# Patient Record
Sex: MALE | Race: WHITE | NOT HISPANIC OR LATINO | Employment: FULL TIME | URBAN - METROPOLITAN AREA
[De-identification: names, ages, dates, MRNs, and addresses within clinical notes are randomized per-mention and may not be internally consistent; named-entity substitution may affect disease eponyms.]

---

## 2017-04-29 ENCOUNTER — OFFICE VISIT (OUTPATIENT)
Dept: URGENT CARE | Facility: CLINIC | Age: 42
End: 2017-04-29
Payer: COMMERCIAL

## 2017-04-29 VITALS
WEIGHT: 171 LBS | TEMPERATURE: 98.4 F | OXYGEN SATURATION: 99 % | HEIGHT: 70 IN | HEART RATE: 62 BPM | BODY MASS INDEX: 24.48 KG/M2 | DIASTOLIC BLOOD PRESSURE: 82 MMHG | SYSTOLIC BLOOD PRESSURE: 102 MMHG

## 2017-04-29 DIAGNOSIS — J03.90 TONSILLITIS: ICD-10-CM

## 2017-04-29 LAB
INT CON NEG: NEGATIVE
INT CON POS: POSITIVE
S PYO AG THROAT QL: NEGATIVE

## 2017-04-29 PROCEDURE — 99214 OFFICE O/P EST MOD 30 MIN: CPT | Performed by: PHYSICIAN ASSISTANT

## 2017-04-29 PROCEDURE — 87880 STREP A ASSAY W/OPTIC: CPT | Performed by: PHYSICIAN ASSISTANT

## 2017-04-29 RX ORDER — AZITHROMYCIN 250 MG/1
TABLET, FILM COATED ORAL
Qty: 6 TAB | Refills: 1 | Status: SHIPPED | OUTPATIENT
Start: 2017-04-29 | End: 2017-12-27

## 2017-04-29 ASSESSMENT — ENCOUNTER SYMPTOMS
EYES NEGATIVE: 1
TROUBLE SWALLOWING: 1
SWOLLEN GLANDS: 1
SORE THROAT: 1
CONSTITUTIONAL NEGATIVE: 1
COUGH: 0
RESPIRATORY NEGATIVE: 1

## 2017-04-29 NOTE — PROGRESS NOTES
"Subjective:      Ben Lackey is a 42 y.o. male who presents with Pharyngitis            Pharyngitis   This is a new problem. The current episode started in the past 7 days. The problem has been unchanged. Neither side of throat is experiencing more pain than the other. There has been no fever. The pain is moderate. Associated symptoms include swollen glands and trouble swallowing. Pertinent negatives include no congestion or coughing. He has had no exposure to strep or mono. He has tried nothing for the symptoms. The treatment provided no relief.       Review of Systems   Constitutional: Negative.    HENT: Positive for sore throat and trouble swallowing. Negative for congestion.    Eyes: Negative.    Respiratory: Negative.  Negative for cough.    Skin: Negative.           Objective:     /82 mmHg  Pulse 62  Temp(Src) 36.9 °C (98.4 °F)  Ht 1.778 m (5' 10\")  Wt 77.565 kg (171 lb)  BMI 24.54 kg/m2  SpO2 99%     Physical Exam   Constitutional: He is oriented to person, place, and time. He appears well-developed and well-nourished. No distress.   HENT:   Head: Normocephalic and atraumatic.   .tons  +phar./tons redn       Eyes: EOM are normal. Pupils are equal, round, and reactive to light.   Neck: Normal range of motion. Neck supple.   Cardiovascular: Normal rate.    Pulmonary/Chest: Effort normal. No respiratory distress.   Lymphadenopathy:     He has cervical adenopathy.   Neurological: He is alert and oriented to person, place, and time.   Skin: Skin is warm and dry.   Psychiatric: He has a normal mood and affect. His behavior is normal. Judgment and thought content normal.   Nursing note and vitals reviewed.    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     No current facility-administered medications on file prior to visit.     Gargles, Cepacol lozenges, Aleve/Advil as needed for throat pain  Family History   Problem Relation Age of Onset   • Cancer Maternal Grandmother      colon   • Heart Attack Paternal Grandfather    • Hypertension Mother      Amoxicillin and Pcn         rst ng     Assessment/Plan:     ·  tonsillitis      · Zpak; otc prn

## 2017-04-29 NOTE — MR AVS SNAPSHOT
"        Ben Lackey   2017 12:00 PM   Office Visit   MRN: [id number]    Department:  [facility]   Dept Phone:  [phone]    Description:  Male : 1975   Provider:  Kendell Elena PA-C           Reason for Visit     Pharyngitis X Monday, worse last night, Hard to swallow, post nasal drip       Allergies as of 2017     Allergen Noted Reactions    Amoxicillin 10/30/2014   Hives    Pcn [Penicillins] 10/30/2014   Hives      You were diagnosed with     Tonsillitis   [[id number]]         Vital Signs     Blood Pressure Pulse Temperature Height Weight Body Mass Index    102/82 mmHg 62 36.9 °C (98.4 °F) 1.778 m (5' 10\") 77.565 kg (171 lb) 24.54 kg/m2    Oxygen Saturation Smoking Status                99% Former Smoker          Basic Information     Date Of Birth Sex Race Ethnicity Preferred Language    1975 Male White Non- English      Problem List              ICD-10-CM Priority Class Noted - Resolved    Insomnia G47.00   2016 - Present    Eosinophilic colitis K52.82   2016 - Present      Health Maintenance        Date Due Completion Dates    IMM DTaP/Tdap/Td Vaccine (1 - Tdap) 2/15/1996 ---    COLONOSCOPY 2020            Current Immunizations     No immunizations on file.      Below and/or attached are the medications your provider expects you to take. Review all of your home medications and newly ordered medications with your provider and/or pharmacist. Follow medication instructions as directed by your provider and/or pharmacist. Please keep your medication list with you and share with your provider. Update the information when medications are discontinued, doses are changed, or new medications (including over-the-counter products) are added; and carry medication information at all times in the event of emergency situations     Allergies:  AMOXICILLIN - Hives     PCN - Hives               Medications  Valid as of: 2017 - 12:46 PM    Generic Name " Brand Name Tablet Size Instructions for use    Azithromycin (Tab) ZITHROMAX 250 MG z-josé luis; U.D.        .                 Medicines prescribed today were sent to:     [id number] - [place], [place] - [address]    [address] [place] [address]    Phone: [phone] Fax: [phone]    Open 24 Hours?: No      Medication refill instructions:       If your prescription bottle indicates you have medication refills left, it is not necessary to call your provider’s office. Please contact your pharmacy and they will refill your medication.    If your prescription bottle indicates you do not have any refills left, you may request refills at any time through one of the following ways: The online [employer] system (except Urgent Care), by calling your provider’s office, or by asking your pharmacy to contact your provider’s office with a refill request. Medication refills are processed only during regular business hours and may not be available until the next business day. Your provider may request additional information or to have a follow-up visit with you prior to refilling your medication.   *Please Note: Medication refills are assigned a new Rx number when refilled electronically. Your pharmacy may indicate that no refills were authorized even though a new prescription for the same medication is available at the pharmacy. Please request the medicine by name with the pharmacy before contacting your provider for a refill.           [employer] Access Code: Activation code not generated  Current [employer] Status: Active

## 2017-10-06 ENCOUNTER — TELEPHONE (OUTPATIENT)
Dept: MEDICAL GROUP | Facility: MEDICAL CENTER | Age: 42
End: 2017-10-06

## 2017-10-06 NOTE — TELEPHONE ENCOUNTER
Future Appointments       Provider Department Center    10/13/2017 1:20 PM Bairon Calderon M.D. [facility] [address]        ESTABLISHED PATIENT PRE-VISIT PLANNING     Note: Patient will not be contacted if there is no indication to call.     1.  Reviewed note from last office visit with PCP and/or other med group provider: Yes    2.  If any orders were placed at last visit, do we have Results/Consult Notes?        •  Labs - Labs were not ordered at last office visit.       •  Imaging - Imaging was not ordered at last office visit.       •  Referrals - No referrals were ordered at last office visit.    3.  Immunizations were updated in [employer] using [employer]?: Yes       •  [employer] Recommendations: FLU    4.  Patient is due for the following Health Maintenance Topics:   Health Maintenance Due   Topic Date Due   • IMM INFLUENZA (1) 09/01/2017           5.  Patient was not informed to arrive 15 min prior to their scheduled appointment and bring in their medication bottles.

## 2017-12-27 ENCOUNTER — OFFICE VISIT (OUTPATIENT)
Dept: MEDICAL GROUP | Facility: MEDICAL CENTER | Age: 42
End: 2017-12-27
Payer: COMMERCIAL

## 2017-12-27 VITALS
HEART RATE: 94 BPM | RESPIRATION RATE: 16 BRPM | DIASTOLIC BLOOD PRESSURE: 60 MMHG | SYSTOLIC BLOOD PRESSURE: 116 MMHG | WEIGHT: 165 LBS | HEIGHT: 70 IN | OXYGEN SATURATION: 98 % | BODY MASS INDEX: 23.62 KG/M2 | TEMPERATURE: 99.5 F

## 2017-12-27 DIAGNOSIS — Z13.220 SCREENING, LIPID: ICD-10-CM

## 2017-12-27 DIAGNOSIS — M54.6 CHRONIC LEFT-SIDED THORACIC BACK PAIN: ICD-10-CM

## 2017-12-27 DIAGNOSIS — G89.29 CHRONIC LEFT-SIDED THORACIC BACK PAIN: ICD-10-CM

## 2017-12-27 DIAGNOSIS — F51.01 PRIMARY INSOMNIA: ICD-10-CM

## 2017-12-27 DIAGNOSIS — K21.9 GASTROESOPHAGEAL REFLUX DISEASE WITHOUT ESOPHAGITIS: ICD-10-CM

## 2017-12-27 PROBLEM — F41.9 ANXIETY: Status: ACTIVE | Noted: 2017-12-27

## 2017-12-27 PROBLEM — F41.9 ANXIETY: Status: RESOLVED | Noted: 2017-12-27 | Resolved: 2017-12-27

## 2017-12-27 PROCEDURE — 99214 OFFICE O/P EST MOD 30 MIN: CPT | Performed by: INTERNAL MEDICINE

## 2017-12-27 RX ORDER — OMEPRAZOLE 20 MG/1
20 CAPSULE, DELAYED RELEASE ORAL DAILY
COMMUNITY
End: 2021-10-27

## 2017-12-27 RX ORDER — CYCLOBENZAPRINE HCL 10 MG
10 TABLET ORAL 3 TIMES DAILY PRN
Qty: 30 TAB | Refills: 0 | Status: SHIPPED | OUTPATIENT
Start: 2017-12-27 | End: 2018-08-31

## 2017-12-27 ASSESSMENT — PATIENT HEALTH QUESTIONNAIRE - PHQ9: CLINICAL INTERPRETATION OF PHQ2 SCORE: 0

## 2017-12-27 NOTE — PROGRESS NOTES
"CC: Multiple issues    HPI:   Ben presents today with the following.    1. Gastroesophageal reflux disease without esophagitis  Presents complaining intermittent reflux but 2 days ago reported some difficulty swallowing with food going slowly through his lower chest. No true complete sticking is never had something like this before. Started taking acid pills and symptoms have improved. Denies any blood in stool or black stools.    2. Primary insomnia  Increased stress at work reporting difficulty falling asleep and staying asleep. No true depression or anxiety symptoms.    3. Chronic left-sided thoracic back pain  Having left-sided thoracic back pain no radiation to his legs or arms. Pain is 4-10 intensity somewhat positionally related. Denies any chest pain or shortness of breath.     4. 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     • cyclobenzaprine (FLEXERIL) 10 MG Tab Take 1 Tab by mouth 3 times a day as needed. 30 Tab 0     No current facility-administered medications for this visit.          Allergies as of 12/27/2017 - Reviewed 12/27/2017   Allergen Reaction Noted   • Amoxicillin Hives 10/30/2014   • Pcn [penicillins] Hives 10/30/2014        ROS: As per HPI.    /60   Pulse 94   Temp 37.5 °C (99.5 °F)   Resp 16   Ht 1.778 m (5' 10\")   Wt 74.8 kg (165 lb)   SpO2 98%   BMI 23.68 kg/m²     Physical Exam:  Gen:         Alert and oriented, No apparent distress.  Neck:        No Lymphadenopathy or Bruits.  Lungs:     Clear to auscultation bilaterally  CV:          Regular rate and rhythm. No murmurs, rubs or gallops.               Ext:          No clubbing, cyanosis, edema.      Assessment " and Plan.   42 y.o. male with the following issues.    1. Gastroesophageal reflux disease without esophagitis  Continue PPI referral to gastroenterology  - REFERRAL TO GASTROENTEROLOGY    2. Primary insomnia  Trial of muscle relaxants below we will see if also helps with sleep if not suggested Benadryl over-the-counter.    3. Chronic left-sided thoracic back pain  Recommendations for physical therapy like to hold off muscle relaxant and not improving he'll contact the office.  - cyclobenzaprine (FLEXERIL) 10 MG Tab; Take 1 Tab by mouth 3 times a day as needed.  Dispense: 30 Tab; Refill: 0    4. Screening, lipid    - COMP METABOLIC PANEL; Future  - LIPID PROFILE; Future

## 2018-01-09 ENCOUNTER — HOSPITAL ENCOUNTER (OUTPATIENT)
Dept: LAB | Facility: MEDICAL CENTER | Age: 43
End: 2018-01-09
Attending: INTERNAL MEDICINE
Payer: COMMERCIAL

## 2018-01-09 DIAGNOSIS — Z13.220 SCREENING, LIPID: ICD-10-CM

## 2018-01-09 PROCEDURE — 80061 LIPID PANEL: CPT

## 2018-01-09 PROCEDURE — 36415 COLL VENOUS BLD VENIPUNCTURE: CPT

## 2018-01-09 PROCEDURE — 80053 COMPREHEN METABOLIC PANEL: CPT

## 2018-01-10 LAB
ALBUMIN SERPL BCP-MCNC: 4.5 G/DL (ref 3.2–4.9)
ALBUMIN/GLOB SERPL: 2.3 G/DL
ALP SERPL-CCNC: 39 U/L (ref 30–99)
ALT SERPL-CCNC: 20 U/L (ref 2–50)
ANION GAP SERPL CALC-SCNC: 6 MMOL/L (ref 0–11.9)
AST SERPL-CCNC: 22 U/L (ref 12–45)
BILIRUB SERPL-MCNC: 0.4 MG/DL (ref 0.1–1.5)
BUN SERPL-MCNC: 10 MG/DL (ref 8–22)
CALCIUM SERPL-MCNC: 9.6 MG/DL (ref 8.5–10.5)
CHLORIDE SERPL-SCNC: 103 MMOL/L (ref 96–112)
CHOLEST SERPL-MCNC: 141 MG/DL (ref 100–199)
CO2 SERPL-SCNC: 27 MMOL/L (ref 20–33)
CREAT SERPL-MCNC: 0.94 MG/DL (ref 0.5–1.4)
GLOBULIN SER CALC-MCNC: 2 G/DL (ref 1.9–3.5)
GLUCOSE SERPL-MCNC: 91 MG/DL (ref 65–99)
HDLC SERPL-MCNC: 62 MG/DL
LDLC SERPL CALC-MCNC: 68 MG/DL
POTASSIUM SERPL-SCNC: 4.6 MMOL/L (ref 3.6–5.5)
PROT SERPL-MCNC: 6.5 G/DL (ref 6–8.2)
SODIUM SERPL-SCNC: 136 MMOL/L (ref 135–145)
TRIGL SERPL-MCNC: 54 MG/DL (ref 0–149)

## 2018-04-01 ENCOUNTER — OFFICE VISIT (OUTPATIENT)
Dept: URGENT CARE | Facility: CLINIC | Age: 43
End: 2018-04-01
Payer: COMMERCIAL

## 2018-04-01 VITALS
WEIGHT: 167.55 LBS | DIASTOLIC BLOOD PRESSURE: 74 MMHG | SYSTOLIC BLOOD PRESSURE: 120 MMHG | OXYGEN SATURATION: 98 % | HEIGHT: 70 IN | BODY MASS INDEX: 23.99 KG/M2 | HEART RATE: 78 BPM | TEMPERATURE: 98 F

## 2018-04-01 DIAGNOSIS — R68.89 FLU-LIKE SYMPTOMS: ICD-10-CM

## 2018-04-01 DIAGNOSIS — J02.9 ACUTE PHARYNGITIS, UNSPECIFIED ETIOLOGY: ICD-10-CM

## 2018-04-01 PROCEDURE — 99214 OFFICE O/P EST MOD 30 MIN: CPT | Performed by: PHYSICIAN ASSISTANT

## 2018-04-01 RX ORDER — OSELTAMIVIR PHOSPHATE 75 MG/1
75 CAPSULE ORAL 2 TIMES DAILY
Qty: 10 CAP | Refills: 0 | Status: SHIPPED | OUTPATIENT
Start: 2018-04-01 | End: 2018-04-06

## 2018-04-01 RX ORDER — AZITHROMYCIN 250 MG/1
TABLET, FILM COATED ORAL
Qty: 6 TAB | Refills: 1 | Status: SHIPPED | OUTPATIENT
Start: 2018-04-01 | End: 2018-08-31

## 2018-04-01 ASSESSMENT — ENCOUNTER SYMPTOMS
CARDIOVASCULAR NEGATIVE: 1
COUGH: 0
SWOLLEN GLANDS: 1
STRIDOR: 0
RESPIRATORY NEGATIVE: 1
EYES NEGATIVE: 1
SORE THROAT: 1
MYALGIAS: 1
TROUBLE SWALLOWING: 1
CONSTITUTIONAL NEGATIVE: 1

## 2018-04-01 NOTE — PROGRESS NOTES
"Subjective:      Ben Lackey is a 43 y.o. male who presents with Pharyngitis (x3 days)            Pharyngitis    This is a new (acute st, aches) problem. The current episode started in the past 7 days. The problem has been unchanged. Neither side of throat is experiencing more pain than the other. There has been no fever. The pain is severe. Associated symptoms include swollen glands and trouble swallowing. Pertinent negatives include no coughing or stridor. He has had no exposure to strep. He has tried nothing for the symptoms. The treatment provided no relief.       Review of Systems   Constitutional: Negative.    HENT: Positive for sore throat and trouble swallowing.    Eyes: Negative.    Respiratory: Negative.  Negative for cough and stridor.    Cardiovascular: Negative.    Musculoskeletal: Positive for myalgias.   Skin: Negative.           Objective:     /74   Pulse 78   Temp 36.7 °C (98 °F)   Ht 1.778 m (5' 10\")   Wt 76 kg (167 lb 8.8 oz)   SpO2 98%   BMI 24.04 kg/m²      Physical Exam   Constitutional: He is oriented to person, place, and time. He appears well-developed and well-nourished. No distress.   HENT:   Head: Normocephalic and atraumatic.   +phar./tons redn       Eyes: EOM are normal. Pupils are equal, round, and reactive to light.   Neck: Normal range of motion. Neck supple.   Cardiovascular: Normal rate.    Pulmonary/Chest: Effort normal and breath sounds normal. No respiratory distress.   Lymphadenopathy:     He has cervical adenopathy.   Neurological: He is alert and oriented to person, place, and time.   Skin: Skin is warm and dry.   Psychiatric: He has a normal mood and affect. His behavior is normal.   Nursing note and vitals reviewed.       rst ng       Assessment/Plan:     1. Flu-like symptoms    - azithromycin (ZITHROMAX) 250 MG Tab; z-jos éluis; U.D.  Dispense: 6 Tab; Refill: 1  - oseltamivir (TAMIFLU) 75 MG Cap; Take 1 Cap by mouth 2 times a day for 5 days.  Dispense: 10 Cap; " Refill: 0  - Alum & Mag Hydroxide-Simeth (MBX) Suspension; Take 15 mL by mouth 4 times a day as needed (to gargle and swallow or spit for sore throat). (Pharm, mix: Mylanta / visc lidoc. / benadryl in 50-50-50ml mix.  rw)  Dispense: 150 mL; Refill: 0    2. Acute pharyngitis, unspecified etiology

## 2018-04-23 ENCOUNTER — OFFICE VISIT (OUTPATIENT)
Dept: URGENT CARE | Facility: CLINIC | Age: 43
End: 2018-04-23
Payer: COMMERCIAL

## 2018-04-23 VITALS
WEIGHT: 167.99 LBS | TEMPERATURE: 98.6 F | HEIGHT: 70 IN | SYSTOLIC BLOOD PRESSURE: 110 MMHG | HEART RATE: 93 BPM | OXYGEN SATURATION: 96 % | DIASTOLIC BLOOD PRESSURE: 70 MMHG | BODY MASS INDEX: 24.05 KG/M2

## 2018-04-23 DIAGNOSIS — M94.0 ACUTE COSTOCHONDRITIS: ICD-10-CM

## 2018-04-23 DIAGNOSIS — J22 ACUTE LOWER RESPIRATORY INFECTION: Primary | ICD-10-CM

## 2018-04-23 PROCEDURE — 99214 OFFICE O/P EST MOD 30 MIN: CPT | Performed by: PHYSICIAN ASSISTANT

## 2018-04-23 RX ORDER — CODEINE PHOSPHATE/GUAIFENESIN 10-100MG/5
10 LIQUID (ML) ORAL 4 TIMES DAILY PRN
Qty: 200 ML | Refills: 0 | Status: SHIPPED | OUTPATIENT
Start: 2018-04-23 | End: 2018-04-28

## 2018-04-23 RX ORDER — DOXYCYCLINE HYCLATE 100 MG
100 TABLET ORAL 2 TIMES DAILY
Qty: 20 TAB | Refills: 0 | Status: SHIPPED | OUTPATIENT
Start: 2018-04-23 | End: 2018-05-03

## 2018-04-23 ASSESSMENT — ENCOUNTER SYMPTOMS
WHEEZING: 0
SORE THROAT: 0
MYALGIAS: 1
PALPITATIONS: 0
SPUTUM PRODUCTION: 1
COUGH: 1
FEVER: 0
SHORTNESS OF BREATH: 0

## 2018-04-23 NOTE — PROGRESS NOTES
"Subjective:      Ben Lackey is a 43 y.o. male who presents with Cough (\"not sleeping,chest congestion,painful cough,fatigue\")    PMH: Reviewed with patient/family member/EPIC.   MEDS:   Current Outpatient Prescriptions:   •  omeprazole (PRILOSEC) 20 MG delayed-release capsule, Take 20 mg by mouth every day., Disp: , Rfl:   •  azithromycin (ZITHROMAX) 250 MG Tab, z-josé luis; U.D., Disp: 6 Tab, Rfl: 1  •  Alum & Mag Hydroxide-Simeth (MBX) Suspension, Take 15 mL by mouth 4 times a day as needed (to gargle and swallow or spit for sore throat). (Pharm, mix: Mylanta / visc lidoc. / benadryl in 50-50-50ml mix.  rw), Disp: 150 mL, Rfl: 0  •  cyclobenzaprine (FLEXERIL) 10 MG Tab, Take 1 Tab by mouth 3 times a day as needed., Disp: 30 Tab, Rfl: 0  ALLERGIES:   Allergies   Allergen Reactions   • Amoxicillin Hives   • Pcn [Penicillins] Hives     SURGHX: No past surgical history on file.  SOCHX:  reports that he quit smoking about 23 years ago. He has never used smokeless tobacco. He reports that he drinks about 7.0 oz of alcohol per week . He reports that he does not use drugs.  FH: family history includes Cancer in his maternal grandmother; Heart Attack in his paternal grandfather; Hypertension in his mother. Reviewed with patient/family. Not pertinent to this complaint.            Cough   This is a new problem. The current episode started 1 to 4 weeks ago. The problem has been gradually worsening. The problem occurs every few minutes. The cough is productive of sputum. Associated symptoms include myalgias, nasal congestion and postnasal drip. Pertinent negatives include no chest pain, fever, sore throat, shortness of breath or wheezing. The symptoms are aggravated by lying down. He has tried body position changes, OTC cough suppressant and rest for the symptoms. The treatment provided no relief. His past medical history is significant for pneumonia (twice).       Review of Systems   Constitutional: Positive for " "malaise/fatigue. Negative for fever.   HENT: Positive for postnasal drip. Negative for sore throat.    Respiratory: Positive for cough and sputum production. Negative for shortness of breath and wheezing.    Cardiovascular: Negative for chest pain and palpitations.   Musculoskeletal: Positive for myalgias.   All other systems reviewed and are negative.         Objective:     /70   Pulse 93   Temp 37 °C (98.6 °F)   Ht 1.778 m (5' 10\")   Wt 76.2 kg (167 lb 15.9 oz)   SpO2 96%   BMI 24.10 kg/m²      Physical Exam   Constitutional: He is oriented to person, place, and time. He appears well-developed and well-nourished. No distress.   HENT:   Head: Normocephalic and atraumatic.   Right Ear: Tympanic membrane normal.   Left Ear: Tympanic membrane normal.   Nose: Mucosal edema and rhinorrhea present.   Mouth/Throat: Uvula is midline. Posterior oropharyngeal erythema present. No posterior oropharyngeal edema.   Eyes: Conjunctivae and EOM are normal. Pupils are equal, round, and reactive to light.   Neck: Normal range of motion. Neck supple.   Cardiovascular: Normal rate, regular rhythm and normal heart sounds.    Pulmonary/Chest: Effort normal. No respiratory distress. He has no decreased breath sounds. He has no wheezes. He has rhonchi. He has no rales.   Productive coarse cough   Abdominal: Soft.   Musculoskeletal: Normal range of motion.   Lymphadenopathy:     He has no cervical adenopathy.   Neurological: He is alert and oriented to person, place, and time. Gait normal.   Skin: Skin is warm and dry. Capillary refill takes less than 2 seconds.   Psychiatric: He has a normal mood and affect.   Nursing note and vitals reviewed.              Assessment/Plan:     1. Acute lower respiratory infection  doxycycline (VIBRAMYCIN) 100 MG Tab    guaifenesin-codeine (TUSSI-ORGANIDIN NR) 100-10 MG/5ML syrup   2. Acute costochondritis  doxycycline (VIBRAMYCIN) 100 MG Tab    guaifenesin-codeine (TUSSI-ORGANIDIN NR) 100-10 " MG/5ML syrup       PT can continue OTC medications, increase fluids and rest until symptoms improve.     PT instructed not to drive or operate heavy machinery or drink alcohol while taking this medication because it contains either narcotic/benzodiazepines and causes drowsiness. PT verbalized understanding of these instructions.     [facility] Aware web site evaluation: I have obtained and reviewed patient utilization report from [facility] pharmacy database prior to writing prescription for controlled substance.  No history of abuse.      PT should follow up with PCP in 1-2 days for re-evaluation if symptoms have not improved.  Discussed red flags and reasons to return to UC or ED.  Pt and/or family verbalized understanding of diagnosis and follow up instructions and was offered informational handout on diagnosis.  PT discharged.

## 2018-08-25 ENCOUNTER — OFFICE VISIT (OUTPATIENT)
Dept: URGENT CARE | Facility: CLINIC | Age: 43
End: 2018-08-25
Payer: COMMERCIAL

## 2018-08-25 VITALS
TEMPERATURE: 98 F | DIASTOLIC BLOOD PRESSURE: 70 MMHG | BODY MASS INDEX: 24.62 KG/M2 | SYSTOLIC BLOOD PRESSURE: 124 MMHG | RESPIRATION RATE: 16 BRPM | HEART RATE: 68 BPM | WEIGHT: 172 LBS | OXYGEN SATURATION: 98 % | HEIGHT: 70 IN

## 2018-08-25 DIAGNOSIS — L03.011 PARONYCHIA OF FINGER OF RIGHT HAND: ICD-10-CM
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
## 2018-08-25 DIAGNOSIS — S61.259A DOG BITE OF FINGER, INITIAL ENCOUNTER: ICD-10-CM

## 2018-08-25 PROCEDURE — 99214 OFFICE O/P EST MOD 30 MIN: CPT | Performed by: PHYSICIAN ASSISTANT

## 2018-08-25 RX ORDER — AZITHROMYCIN 500 MG/1
500 TABLET, FILM COATED ORAL DAILY
Qty: 3 TAB | Refills: 1 | Status: SHIPPED | OUTPATIENT
Start: 2018-08-25 | End: 2018-08-28

## 2018-08-25 RX ORDER — CLINDAMYCIN HYDROCHLORIDE 300 MG/1
300 CAPSULE ORAL 3 TIMES DAILY
Qty: 21 CAP | Refills: 0 | Status: SHIPPED | OUTPATIENT
Start: 2018-08-25 | End: 2018-09-01

## 2018-08-25 ASSESSMENT — ENCOUNTER SYMPTOMS
SWOLLEN GLANDS: 0
CONSTITUTIONAL NEGATIVE: 1
NEUROLOGICAL NEGATIVE: 1
FEVER: 0
MUSCULOSKELETAL NEGATIVE: 1

## 2018-08-25 NOTE — PROGRESS NOTES
"Subjective:      Ben Lackey is a 43 y.o. male who presents with Dog Bite (dog bite on middle finger, yellow puss coming out of nail area, numb feeling )            Other   This is a new problem. The current episode started yesterday (dogbite 3rd finger; infected). The problem occurs constantly. The problem has been unchanged. Pertinent negatives include no fever or swollen glands. The symptoms are aggravated by bending. He has tried rest for the symptoms. The treatment provided no relief.       Review of Systems   Constitutional: Negative.  Negative for fever.   Musculoskeletal: Negative.    Skin: Negative.    Neurological: Negative.           Objective:     /70   Pulse 68   Temp 36.7 °C (98 °F)   Resp 16   Ht 1.778 m (5' 10\")   Wt 78 kg (172 lb)   SpO2 98%   BMI 24.68 kg/m²      Physical Exam   Constitutional: He is oriented to person, place, and time. He appears well-developed and well-nourished. No distress.   Musculoskeletal: Normal range of motion. He exhibits edema and tenderness (4th finger dogbite area redn/sw/tend; paronychia inf 3rd finger).   Neurological: He is alert and oriented to person, place, and time. No sensory deficit. He exhibits abnormal muscle tone. Coordination normal.   Skin: Skin is warm and dry. Capillary refill takes less than 2 seconds.   Psychiatric: He has a normal mood and affect. His behavior is normal.   Nursing note and vitals reviewed.    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 6 Tab 1   • Alum & Mag Hydroxide-Simeth (MBX) Suspension Take 15 mL by mouth 4 times a day as " needed (to gargle and swallow or spit for sore throat). (Pharm, mix: Mylanta / visc lidoc. / benadryl in 50-50-50ml mix.  rw) 150 mL 0   • omeprazole (PRILOSEC) 20 MG delayed-release capsule Take 20 mg by mouth every day.     • cyclobenzaprine (FLEXERIL) 10 MG Tab Take 1 Tab by mouth 3 times a day as needed. 30 Tab 0     No current facility-administered medications on file prior to visit.      Social History     Social History   • Marital status:      Spouse name: N/A   • Number of children: N/A   • Years of education: N/A     Occupational History   • Not on file.     Social History Main Topics   • Smoking status: Former Smoker     Quit date: 10/30/1994   • Smokeless tobacco: Never Used   • Alcohol use 7.0 oz/week     14 Cans of beer per week   • Drug use: No   • Sexual activity: Yes     Partners: Female      Comment: wife     Other Topics Concern   • Not on file     Social History Narrative   • No narrative on file     Family History   Problem Relation Age of Onset   • Cancer Maternal Grandmother         colon   • Heart Attack Paternal Grandfather    • Hypertension Mother      Amoxicillin and Pcn [penicillins]              Assessment/Plan:     ·  dogbite finger, infected      · rx abx; local wound care

## 2018-08-31 ENCOUNTER — OFFICE VISIT (OUTPATIENT)
Dept: MEDICAL GROUP | Facility: MEDICAL CENTER | Age: 43
End: 2018-08-31
Payer: COMMERCIAL

## 2018-08-31 VITALS
TEMPERATURE: 98.5 F | BODY MASS INDEX: 24.62 KG/M2 | HEIGHT: 70 IN | DIASTOLIC BLOOD PRESSURE: 70 MMHG | OXYGEN SATURATION: 99 % | SYSTOLIC BLOOD PRESSURE: 110 MMHG | RESPIRATION RATE: 16 BRPM | HEART RATE: 70 BPM | WEIGHT: 172 LBS

## 2018-08-31 DIAGNOSIS — M26.621 ARTHRALGIA OF RIGHT TEMPOROMANDIBULAR JOINT: ICD-10-CM

## 2018-08-31 PROCEDURE — 99213 OFFICE O/P EST LOW 20 MIN: CPT | Performed by: INTERNAL MEDICINE

## 2018-08-31 RX ORDER — METHYLPREDNISOLONE 4 MG/1
TABLET ORAL
Qty: 21 TAB | Refills: 0 | Status: SHIPPED | OUTPATIENT
Start: 2018-08-31 | End: 2021-10-27

## 2018-08-31 NOTE — PROGRESS NOTES
"CC: Jaw pain    HPI:   Ben presents today with the following.    1. Arthralgia of right temporomandibular joint  Presents complaining of right-sided jaw pain.  He reports it hurts with chewing.  There is some slight clicking.  Denies any fevers chills.  He is current with a tetanus shot.  He reports he ate soup yesterday and today it is feeling somewhat better.  Pain is 4 out of 10 intensity worse with chewing.  Ibuprofen is very hard in his stomach and he cannot take.      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 21 Tab 0   • omeprazole (PRILOSEC) 20 MG delayed-release capsule Take 20 mg by mouth every day.     • clindamycin (CLEOCIN) 300 MG Cap Take 1 Cap by mouth 3 times a day for 7 days. (Patient not taking: Reported on 8/31/2018) 21 Cap 0   • Alum & Mag Hydroxide-Simeth (MBX) Suspension Take 15 mL by mouth 4 times a day as needed (to gargle and swallow or spit for sore throat). (Pharm, mix: Mylanta / visc lidoc. / benadryl in 50-50-50ml mix.  rw) (Patient not taking: Reported on 8/31/2018) 150 mL 0     No current facility-administered medications for this visit.          Allergies as of 08/31/2018 - Reviewed 08/31/2018   Allergen Reaction Noted   • Amoxicillin Hives 10/30/2014   • Pcn [penicillins] Hives 10/30/2014        ROS: Denies Chest pain, SOB, LE edema.    /70   Pulse 70   Temp 36.9 °C (98.5 °F)   Resp 16   Ht 1.778 m (5' 10\")   Wt 78 kg (172 lb)   SpO2 99%   BMI 24.68 kg/m²     Physical Exam:  Gen:         Alert and oriented, No apparent distress.  Neck:        No Lymphadenopathy or Bruits.  Lungs:     Clear to auscultation bilaterally  CV:          Regular rate and rhythm. No murmurs, rubs or gallops.   "             Ext:          No clubbing, cyanosis, edema.      Assessment and Plan.   43 y.o. male with the following issues.    1. Arthralgia of right temporomandibular joint  Consistent with TMJ have placed on a steroid given his intolerance to NSAIDs recommended rest if not improving he will follow-up with Dentist.  - MethylPREDNISolone (MEDROL DOSEPAK) 4 MG Tablet Therapy Pack; Per package insert.  Dispense: 21 Tab; Refill: 0

## 2018-10-11 ENCOUNTER — OFFICE VISIT (OUTPATIENT)
Dept: MEDICAL GROUP | Facility: MEDICAL CENTER | Age: 43
End: 2018-10-11
Payer: COMMERCIAL

## 2018-10-11 VITALS
RESPIRATION RATE: 16 BRPM | DIASTOLIC BLOOD PRESSURE: 78 MMHG | WEIGHT: 171 LBS | TEMPERATURE: 98 F | SYSTOLIC BLOOD PRESSURE: 114 MMHG | HEIGHT: 70 IN | BODY MASS INDEX: 24.48 KG/M2 | HEART RATE: 85 BPM | OXYGEN SATURATION: 97 %

## 2018-10-11 DIAGNOSIS — F41.9 ANXIETY: ICD-10-CM

## 2018-10-11 DIAGNOSIS — R07.89 CHEST DISCOMFORT: ICD-10-CM

## 2018-10-11 DIAGNOSIS — I49.1 PAC (PREMATURE ATRIAL CONTRACTION): ICD-10-CM

## 2018-10-11 PROCEDURE — 99214 OFFICE O/P EST MOD 30 MIN: CPT | Performed by: INTERNAL MEDICINE

## 2018-10-11 RX ORDER — ESCITALOPRAM OXALATE 10 MG/1
10 TABLET ORAL DAILY
Qty: 30 TAB | Refills: 6 | Status: SHIPPED | OUTPATIENT
Start: 2018-10-11 | End: 2021-10-27

## 2018-10-11 NOTE — PROGRESS NOTES
CC: Chest pain, PACs, anxiety.    HPI:   Ben presents today with the following.    1. Chest discomfort  Presents complaining of 3 weeks of chest discomfort.  He describes them lasting seconds unrelated to physical exertion.  He does run on the treadmill as well as lift weights and may notice it when he bench presses occasionally.  He cannot fully describe it is a pain and again only lasts for 1 second.  No shortness of breath no numbness or tingling no other associated symptoms.    2. PAC (premature atrial contraction)  He does bring up that he is been worked up for cardiac issues in the past echocardiogram normal was found to have PACs on multiple Holter monitors.  He does not describe these as similar sensations to PACs.    3. Anxiety  He does admit after discussion that he is anxious about his work currently and is dwelling on several things when he does not need to be.  He fully aware that his chest discomfort is likely nothing but he cannot stop thinking about it.      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 30 Tab 6   • omeprazole (PRILOSEC) 20 MG delayed-release capsule Take 20 mg by mouth every day.     • MethylPREDNISolone (MEDROL DOSEPAK) 4 MG Tablet Therapy Pack Per package insert. 21 Tab 0   • Alum & Mag Hydroxide-Simeth (MBX) Suspension Take 15 mL by mouth 4 times a day as needed (to gargle and swallow or spit for sore throat). (Pharm, mix: Mylanta / visc lidoc. / benadryl in 50-50-50ml mix.  rw) (Patient not taking: Reported on 8/31/2018) 150 mL 0     No current facility-administered medications for this visit.          Allergies as of 10/11/2018 - Reviewed  "10/11/2018   Allergen Reaction Noted   • Amoxicillin Hives 10/30/2014   • Pcn [penicillins] Hives 10/30/2014        ROS: Denies changes to bowel or bladder, SOB, LE edema.    /78   Pulse 85   Temp 36.7 °C (98 °F)   Resp 16   Ht 1.77 m (5' 9.69\")   Wt 77.6 kg (171 lb)   SpO2 97%   BMI 24.76 kg/m²     Physical Exam:  Gen:         Alert and oriented, No apparent distress.  Neck:        No Lymphadenopathy or Bruits.  Lungs:     Clear to auscultation bilaterally  CV:          Regular rate and rhythm. No murmurs, rubs or gallops.               Ext:          No clubbing, cyanosis, edema.      Assessment and Plan.   43 y.o. male with the following issues.    1. Chest discomfort  Given the descriptors and his extreme physical activity very unlikely to be heart related.  He is fine not going any further testing he was given ER precautions and what to watch for.    2. PAC (premature atrial contraction)  Thoroughly worked up in the past he does notice them but is not terribly concerned.    3. Anxiety  Longer discussion today about anxiety he does admit to having most problems he is willing to try Lexapro sent to pharmacy today at a very low dose will see back in 4-6 weeks.  Cautioned about side effects      "

## 2018-11-20 ENCOUNTER — OFFICE VISIT (OUTPATIENT)
Dept: URGENT CARE | Facility: CLINIC | Age: 43
End: 2018-11-20
Payer: COMMERCIAL

## 2018-11-20 VITALS
HEART RATE: 82 BPM | RESPIRATION RATE: 14 BRPM | BODY MASS INDEX: 23.99 KG/M2 | WEIGHT: 167.6 LBS | HEIGHT: 70 IN | OXYGEN SATURATION: 96 % | SYSTOLIC BLOOD PRESSURE: 100 MMHG | DIASTOLIC BLOOD PRESSURE: 80 MMHG | TEMPERATURE: 99.5 F

## 2018-11-20 DIAGNOSIS — J06.9 UPPER RESPIRATORY TRACT INFECTION, UNSPECIFIED TYPE: ICD-10-CM

## 2018-11-20 DIAGNOSIS — R09.82 PND (POST-NASAL DRIP): ICD-10-CM

## 2018-11-20 DIAGNOSIS — R05.9 COUGH: ICD-10-CM

## 2018-11-20 DIAGNOSIS — J02.9 PHARYNGITIS, UNSPECIFIED ETIOLOGY: ICD-10-CM

## 2018-11-20 LAB
INT CON NEG: NORMAL
INT CON POS: NORMAL
S PYO AG THROAT QL: NEGATIVE

## 2018-11-20 PROCEDURE — 87880 STREP A ASSAY W/OPTIC: CPT | Performed by: PHYSICIAN ASSISTANT

## 2018-11-20 PROCEDURE — 99214 OFFICE O/P EST MOD 30 MIN: CPT | Performed by: PHYSICIAN ASSISTANT

## 2018-11-20 RX ORDER — PROMETHAZINE HYDROCHLORIDE AND CODEINE PHOSPHATE 6.25; 1 MG/5ML; MG/5ML
5 SYRUP ORAL EVERY 12 HOURS PRN
Qty: 60 ML | Refills: 0 | Status: SHIPPED | OUTPATIENT
Start: 2018-11-20 | End: 2018-11-27

## 2018-11-20 ASSESSMENT — ENCOUNTER SYMPTOMS
WHEEZING: 0
ABDOMINAL PAIN: 0
SINUS PAIN: 0
HEMOPTYSIS: 1
MYALGIAS: 0
DIARRHEA: 0
VOMITING: 0
PALPITATIONS: 0
SHORTNESS OF BREATH: 0
SPUTUM PRODUCTION: 1
SORE THROAT: 1
BACK PAIN: 0
NAUSEA: 0
FEVER: 0
CHILLS: 0
COUGH: 1

## 2018-11-20 NOTE — PROGRESS NOTES
"Subjective:   Ben Lackey is a 43 y.o. male who presents for Cough (productive x5 days) and Pharyngitis (x1 weeks)        Cough   This is a new problem. The current episode started in the past 7 days. The problem has been gradually worsening. Associated symptoms include hemoptysis and a sore throat. Pertinent negatives include no chest pain, chills, ear pain, fever, myalgias, rash, shortness of breath or wheezing. There is no history of environmental allergies.   Pharyngitis    Associated symptoms include congestion and coughing. Pertinent negatives include no abdominal pain, diarrhea, ear pain, shortness of breath or vomiting.     Notes last 5d of cough and ST, denies fever/chills, ~8d of s/sx thus far, hemoptysis this am, red tinged mucous, prod cough, denies fever/chills, denies ear pain, denies ST all day, worse in the am and at night, cough, keeping awake, denies PMH of asthma, PMH of pneumonia \"twice for sure - really sick last winter\" - thinks suspects bronchitis last year. Denies PMH of seasonal allerg. Tried using cepacol, used Rx from last year - some help w/ visous lido.     Review of Systems   Constitutional: Positive for malaise/fatigue. Negative for chills and fever.   HENT: Positive for congestion and sore throat. Negative for ear pain and sinus pain.    Respiratory: Positive for cough, hemoptysis and sputum production. Negative for shortness of breath and wheezing.    Cardiovascular: Negative for chest pain, palpitations and leg swelling.   Gastrointestinal: Negative for abdominal pain, diarrhea, nausea and vomiting.   Musculoskeletal: Negative for back pain and myalgias.   Skin: Negative for rash.   Endo/Heme/Allergies: Negative for environmental allergies.     Allergies   Allergen Reactions   • Amoxicillin Hives   • Pcn [Penicillins] Hives   I have worn a mask for the entire encounter with this patient.    Objective:   /80 (BP Location: Left arm, Patient Position: Sitting, BP Cuff Size: " "Adult)   Pulse 82   Temp 37.5 °C (99.5 °F)   Resp 14   Ht 1.778 m (5' 10\")   Wt 76 kg (167 lb 9.6 oz)   SpO2 96%   BMI 24.05 kg/m²   Physical Exam   Constitutional: He is oriented to person, place, and time. He appears well-developed and well-nourished. No distress.   HENT:   Head: Normocephalic and atraumatic.   Right Ear: Tympanic membrane, external ear and ear canal normal.   Left Ear: Tympanic membrane, external ear and ear canal normal.   Nose: Nose normal.   Mouth/Throat: Uvula is midline and mucous membranes are normal. Posterior oropharyngeal erythema ( mild PND) present. No oropharyngeal exudate, posterior oropharyngeal edema or tonsillar abscesses.   Eyes: Conjunctivae are normal. Right eye exhibits no discharge. Left eye exhibits no discharge. No scleral icterus.   Neck: Neck supple.   Pulmonary/Chest: Effort normal. No respiratory distress. He has no decreased breath sounds. He has no wheezes. He has no rhonchi. He has no rales.   Musculoskeletal: Normal range of motion.   Lymphadenopathy:     He has cervical adenopathy ( mild bilat).   Neurological: He is alert and oriented to person, place, and time. Coordination normal.   Skin: Skin is warm and dry. He is not diaphoretic. No pallor.   Psychiatric: He has a normal mood and affect.   Nursing note and vitals reviewed.  POCT strep - NEG       Assessment/Plan:   1. Upper respiratory tract infection, unspecified type    2. Cough  - promethazine-codeine (PHENERGAN-CODEINE) 6.25-10 MG/5ML Syrup; Take 5 mL by mouth every 12 hours as needed for up to 7 days.  Dispense: 60 mL; Refill: 0    3. Pharyngitis, unspecified etiology  - lidocaine viscous 2% (XYLOCAINE) 2 % Solution; Take 5 mL by mouth as needed for Throat/Mouth Pain (q6hr PRN throat pain, ok to rinse and spit or swallow).  Dispense: 120 mL; Refill: 0    4. PND (post-nasal drip)  Supportive care is reviewed with patient/caregiver - recommend to push PO fluids and electrolytes, Nsaids/tylenol, " netti pot/saline irrig, humidifier in home, flonase, ponaris, antihistamine, Cautioned regarding potential for sedation with medication.  OTC decongestant, cough suppressant, throat anagesic - call clinic in next 3-4d w/ fever and persistent s/sx for abx to pharm  Return to clinic with lack of resolution or progression of symptoms.        Differential diagnosis, natural history, supportive care, and indications for immediate follow-up discussed.

## 2019-05-10 ENCOUNTER — OFFICE VISIT (OUTPATIENT)
Dept: URGENT CARE | Facility: CLINIC | Age: 44
End: 2019-05-10
Payer: COMMERCIAL

## 2019-05-10 DIAGNOSIS — J02.9 PHARYNGITIS, UNSPECIFIED ETIOLOGY: ICD-10-CM

## 2019-05-10 DIAGNOSIS — R09.82 PND (POST-NASAL DRIP): ICD-10-CM

## 2019-05-10 DIAGNOSIS — R05.9 COUGH: ICD-10-CM

## 2019-05-10 LAB
INT CON NEG: NEGATIVE
INT CON POS: POSITIVE
S PYO AG THROAT QL: NEGATIVE

## 2019-05-10 PROCEDURE — 99214 OFFICE O/P EST MOD 30 MIN: CPT | Performed by: PHYSICIAN ASSISTANT

## 2019-05-10 PROCEDURE — 87880 STREP A ASSAY W/OPTIC: CPT | Performed by: PHYSICIAN ASSISTANT

## 2019-05-10 RX ORDER — AZITHROMYCIN 250 MG/1
TABLET, FILM COATED ORAL
Qty: 6 TAB | Refills: 0 | Status: SHIPPED | OUTPATIENT
Start: 2019-05-10 | End: 2021-10-27

## 2019-05-10 RX ORDER — PROMETHAZINE HYDROCHLORIDE AND CODEINE PHOSPHATE 6.25; 1 MG/5ML; MG/5ML
5 SYRUP ORAL EVERY 12 HOURS PRN
Qty: 60 ML | Refills: 0 | Status: SHIPPED | OUTPATIENT
Start: 2019-05-10 | End: 2019-05-17

## 2019-05-10 RX ORDER — FLUTICASONE PROPIONATE 50 MCG
2 SPRAY, SUSPENSION (ML) NASAL DAILY
Qty: 16 G | Refills: 0 | Status: SHIPPED | OUTPATIENT
Start: 2019-05-10 | End: 2021-10-27

## 2019-05-10 ASSESSMENT — ENCOUNTER SYMPTOMS
CHILLS: 0
SHORTNESS OF BREATH: 0
NAUSEA: 0
DIARRHEA: 0
ABDOMINAL PAIN: 0
SORE THROAT: 1
SPUTUM PRODUCTION: 1
WHEEZING: 0
COUGH: 1
FEVER: 0
VOMITING: 0

## 2019-05-10 NOTE — PROGRESS NOTES
Subjective:   Ben Lackey is a 44 y.o. male who presents for Cough (sore throat, chest congestion, x1wk, blood tint in phlegm, taking mucinex, change in voice, hurt to swallow)        Cough   This is a new problem. The current episode started in the past 7 days. Associated symptoms include a sore throat. Pertinent negatives include no chills, ear pain, fever, rash, shortness of breath or wheezing. There is no history of environmental allergies.     Notes last one week of ST and prod cough, denies fever/chills, c/o persistent cough nightly, c/o sinus congestion, c/o ear fullness bilat, child was sick w/ ear infection, denies nausea/vomiting/abdpain/diarrhea/rash, denies PMH of asthma, PMH of bronchitis/pneumonia 8yrs ago, denies seasonal allerg. Tried mucinex, some relief w/ medication.  Patient requests prescription for antibiotic out of concern for possible need.    Review of Systems   Constitutional: Negative for chills and fever.   HENT: Positive for congestion and sore throat. Negative for ear pain.    Respiratory: Positive for cough and sputum production. Negative for shortness of breath and wheezing.    Gastrointestinal: Negative for abdominal pain, diarrhea, nausea and vomiting.   Skin: Negative for rash.   Endo/Heme/Allergies: Negative for environmental allergies.     Allergies   Allergen Reactions   • Amoxicillin Hives   • Pcn [Penicillins] Hives   I have worn a mask for the entire encounter with this patient.    Objective:   /78   Pulse 83   Temp 37.2 °C (98.9 °F) (Temporal)   SpO2 96%   Physical Exam   Constitutional: He is oriented to person, place, and time. He appears well-developed and well-nourished. No distress.   HENT:   Head: Normocephalic and atraumatic.   Right Ear: Tympanic membrane, external ear and ear canal normal.   Left Ear: Tympanic membrane, external ear and ear canal normal.   Nose: Nose normal.   Mouth/Throat: Uvula is midline and mucous membranes are normal. Posterior  oropharyngeal erythema ( mild PND) present. No oropharyngeal exudate, posterior oropharyngeal edema or tonsillar abscesses.   Eyes: Conjunctivae are normal. Right eye exhibits no discharge. Left eye exhibits no discharge. No scleral icterus.   Neck: Neck supple.   Pulmonary/Chest: Effort normal. No respiratory distress. He has no decreased breath sounds. He has no wheezes. He has no rhonchi. He has no rales.   Musculoskeletal: Normal range of motion.   Lymphadenopathy:     He has cervical adenopathy ( mild bilat).   Neurological: He is alert and oriented to person, place, and time. Coordination normal.   Skin: Skin is warm and dry. He is not diaphoretic. No pallor.   Psychiatric: He has a normal mood and affect.   Nursing note and vitals reviewed.  POCT strep - NEG      Assessment/Plan:   1. Cough  - azithromycin (ZITHROMAX) 250 MG Tab; Take as directed on package. Dispense one package.  Dispense: 6 Tab; Refill: 0  - promethazine-codeine (PHENERGAN-CODEINE) 6.25-10 MG/5ML Syrup; Take 5 mL by mouth every 12 hours as needed for up to 7 days.  Dispense: 60 mL; Refill: 0    2. Pharyngitis, unspecified etiology  - lidocaine viscous 2% (XYLOCAINE) 2 % Solution; Take 5 mL by mouth as needed for Throat/Mouth Pain (q6hr PRN throat pain, ok to rinse and spit or swallow).  Dispense: 120 mL; Refill: 0  - POCT Rapid Strep A  - azithromycin (ZITHROMAX) 250 MG Tab; Take as directed on package. Dispense one package.  Dispense: 6 Tab; Refill: 0    3. PND (post-nasal drip)  - fluticasone (FLONASE) 50 MCG/ACT nasal spray; Spray 2 Sprays in nose every day.  Dispense: 16 g; Refill: 0  Supportive care is reviewed with patient/caregiver - recommend to push PO fluids and electrolytes, Nsaids/tylenol, netti pot/saline irrig, humidifier in home, flonase, ponaris, antihistamine, Contingent antibiotic prescription given to patient to fill upon meeting criteria of guidelines discussed.   Cautioned regarding potential for sedation with  medication.    Differential diagnosis, natural history, supportive care, and indications for immediate follow-up discussed.

## 2019-05-13 VITALS
SYSTOLIC BLOOD PRESSURE: 100 MMHG | TEMPERATURE: 98.9 F | HEART RATE: 83 BPM | WEIGHT: 168 LBS | HEIGHT: 70 IN | OXYGEN SATURATION: 96 % | BODY MASS INDEX: 24.05 KG/M2 | DIASTOLIC BLOOD PRESSURE: 78 MMHG

## 2020-02-21 ENCOUNTER — HOSPITAL ENCOUNTER (EMERGENCY)
Facility: MEDICAL CENTER | Age: 45
End: 2020-02-21
Attending: EMERGENCY MEDICINE
Payer: COMMERCIAL

## 2020-02-21 VITALS
HEART RATE: 69 BPM | DIASTOLIC BLOOD PRESSURE: 89 MMHG | HEIGHT: 70 IN | TEMPERATURE: 97.4 F | RESPIRATION RATE: 16 BRPM | WEIGHT: 165.79 LBS | SYSTOLIC BLOOD PRESSURE: 138 MMHG | OXYGEN SATURATION: 99 % | BODY MASS INDEX: 23.73 KG/M2

## 2020-02-21 DIAGNOSIS — M79.642 HAND PAIN, LEFT: ICD-10-CM

## 2020-02-21 PROCEDURE — 99283 EMERGENCY DEPT VISIT LOW MDM: CPT

## 2020-02-22 NOTE — ED TRIAGE NOTES
"Ben Lackey   45 y.o. male   Chief Complaint   Patient presents with   • Hand Pain     happened yesterday while at the gym, felt a \"weird feeling\" on his left hand, has not gone away, an hour ago noticed purple discoloration to left hand close to the knuckles. + CMS to LUE      Pt amb to triage with steady gait for above complaint.      Pt is alert and oriented, speaking in full sentences, follows commands and responds appropriately to questions. NAD. Resp are even and unlabored.   Pt placed in lobby. Pt educated on triage process. Pt encouraged to alert staff for any changes.    /91   Pulse 72   Temp 36.3 °C (97.4 °F) (Temporal)   Resp 16   Ht 1.778 m (5' 10\")   Wt 75.2 kg (165 lb 12.6 oz)   SpO2 99%   BMI 23.79 kg/m²     "

## 2020-02-22 NOTE — ED PROVIDER NOTES
"ED Provider Note    CHIEF COMPLAINT  Chief Complaint   Patient presents with   • Hand Pain     happened yesterday while at the gym, felt a \"weird feeling\" on his left hand, has not gone away, an hour ago noticed purple discoloration to left hand close to the knuckles. + CMS to PATRICIA GREEN  Ben Lackey is a 45 y.o. male who presents for evaluation of left hand pain.  Patient notes that he thinks was related to lifting heavy weights when doing shoulder shrugs at the gym yesterday.  He remembers no inciting episode however he started having a sore spot between his third and fourth fingers in the web space and this morning he woke up with discoloration to the dorsal aspect of all 4 fingers on the affected side    REVIEW OF SYSTEMS  Constitutional: No fevers or chills  Skin: Discoloration as noted above  Musculoskeletal: No recent blunt trauma however there is mild pain and a mass between the fourth and fifth knuckles on the palmar surface.  Neurologic: Mild sensation of tingling to fingers 2 3 and 4  Heme: No bleeding or bruising problems.   Immuno: No hx of recurrent infections      SOCIAL HISTORY  Social History     Tobacco Use   • Smoking status: Former Smoker     Last attempt to quit: 10/30/1994     Years since quittin.3   • Smokeless tobacco: Never Used   Substance and Sexual Activity   • Alcohol use: Yes     Alcohol/week: 7.0 oz     Types: 14 Cans of beer per week   • Drug use: No   • Sexual activity: Yes     Partners: Female     Comment: wife       SURGICAL HISTORY  patient denies any surgical history    CURRENT MEDICATIONS  Home Medications    **Home medications have not yet been reviewed for this encounter**         ALLERGIES  Allergies   Allergen Reactions   • Amoxicillin Hives   • Pcn [Penicillins] Hives       PHYSICAL EXAM  VITAL SIGNS: /89   Pulse 69   Temp 36.3 °C (97.4 °F) (Temporal)   Resp 16   Ht 1.778 m (5' 10\")   Wt 75.2 kg (165 lb 12.6 oz)   SpO2 99%   BMI 23.79 kg/m²  "   Gen: Alert in no apparent distress.  Skin: Warm, Dry, No erythema.  Faintly purpleish area of discoloration noted on the dorsal aspect of fingers 234 and to a very small degree, 5.  This extends from the MP joint to the PIP joint on fingers 2 3 and 4 and just beyond the MP joint on the fifth digit.  The webspace on the dorsal surface also has the same discoloration.    Back: No bony tenderness, No CVA tenderness.   Extremities: Intact distal pulses, No edema.  Small ill-defined nodule noted on the palmar aspect of the webspace between fingers 2 and 3.  This is mildly tender.  Patient is able to make a fist with 5 out of 5 strength and extend fingers without limitation.  He can oppose the thumb and has no pain in the palmar surface of his hand other than as described.  There is no fusiform swelling of any of the fingers to suggest tenosynovitis or a sending infection.   neurologic: Subjective mild decrease in sensation to the radial aspect of fingers 3 and 4, in the ulnar aspect of fingers 2 and 3.    COURSE & MEDICAL DECISION MAKING  Patient arrives for what appears to be a discoloration and mild tenderness with pain most likely related to trauma from lifting a heavy object at the gym.  This could either be a mild strain, sprain, or muscle tear.  Alternatively, small vessel injury could have occurred causing some mild discoloration and ecchymosis to the dorsum of the hand.  Regardless, he has no significant vascular compromise and has excellent capillary refill to all fingers on affected side.  He has no strength or limitation of range of motion deficits to suggest a significant tendon or ligament injury.  This is likely a self resolving injury and I do not feel any further labs or imaging will benefit him or  emergently.  Patient stated understanding of this and was fine with the plan for watchful waiting with symptom treatment using over-the-counter pain medications and ice/heat.  He will return  if his symptoms worsen or change in any way and will otherwise follow-up with his primary care physician if symptoms persist.    FINAL IMPRESSION  1. Hand pain, left        Electronically signed by: Giancarlo Thompson M.D., 2/21/2020 8:12 PM

## 2021-06-17 ENCOUNTER — APPOINTMENT (RX ONLY)
Dept: URBAN - METROPOLITAN AREA CLINIC 4 | Facility: CLINIC | Age: 46
Setting detail: DERMATOLOGY
End: 2021-06-17

## 2021-06-17 DIAGNOSIS — Z71.89 OTHER SPECIFIED COUNSELING: ICD-10-CM

## 2021-06-17 DIAGNOSIS — L63.8 OTHER ALOPECIA AREATA: ICD-10-CM

## 2021-06-17 PROCEDURE — 99203 OFFICE O/P NEW LOW 30 MIN: CPT

## 2021-06-17 PROCEDURE — ? DIAGNOSIS COMMENT

## 2021-06-17 PROCEDURE — ? COUNSELING

## 2021-06-17 ASSESSMENT — LOCATION ZONE DERM
LOCATION ZONE: TRUNK
LOCATION ZONE: SCALP

## 2021-06-17 ASSESSMENT — LOCATION DETAILED DESCRIPTION DERM
LOCATION DETAILED: SUPERIOR LUMBAR SPINE
LOCATION DETAILED: LEFT MEDIAL FRONTAL SCALP

## 2021-06-17 ASSESSMENT — LOCATION SIMPLE DESCRIPTION DERM
LOCATION SIMPLE: LOWER BACK
LOCATION SIMPLE: LEFT SCALP

## 2021-06-17 NOTE — PROCEDURE: DIAGNOSIS COMMENT
Render Risk Assessment In Note?: no
Detail Level: Simple
Comment: Pt was being treated with kenalog injections. Counseled in oral and topical Jerome inhibitors. Elects to use topical Jerome inhibitor tofacitidid bid.

## 2021-10-25 ENCOUNTER — TELEPHONE (OUTPATIENT)
Dept: SCHEDULING | Facility: IMAGING CENTER | Age: 46
End: 2021-10-25

## 2021-10-26 ENCOUNTER — OFFICE VISIT (OUTPATIENT)
Dept: MEDICAL GROUP | Facility: MEDICAL CENTER | Age: 46
End: 2021-10-26
Payer: COMMERCIAL

## 2021-10-26 VITALS
HEART RATE: 86 BPM | SYSTOLIC BLOOD PRESSURE: 128 MMHG | HEIGHT: 70 IN | TEMPERATURE: 97.8 F | BODY MASS INDEX: 23.48 KG/M2 | WEIGHT: 164 LBS | OXYGEN SATURATION: 97 % | DIASTOLIC BLOOD PRESSURE: 70 MMHG

## 2021-10-26 DIAGNOSIS — R07.1 CHEST PAIN ON BREATHING: ICD-10-CM

## 2021-10-26 DIAGNOSIS — R07.89 ATYPICAL CHEST PAIN: ICD-10-CM

## 2021-10-26 PROCEDURE — 99213 OFFICE O/P EST LOW 20 MIN: CPT | Performed by: STUDENT IN AN ORGANIZED HEALTH CARE EDUCATION/TRAINING PROGRAM

## 2021-10-26 PROCEDURE — 93000 ELECTROCARDIOGRAM COMPLETE: CPT | Performed by: STUDENT IN AN ORGANIZED HEALTH CARE EDUCATION/TRAINING PROGRAM

## 2021-10-26 ASSESSMENT — PATIENT HEALTH QUESTIONNAIRE - PHQ9: CLINICAL INTERPRETATION OF PHQ2 SCORE: 0

## 2021-10-27 PROBLEM — R07.89 ATYPICAL CHEST PAIN: Status: ACTIVE | Noted: 2021-10-27

## 2021-10-27 PROBLEM — R07.1 CHEST PAIN ON BREATHING: Status: ACTIVE | Noted: 2021-10-27

## 2021-10-27 NOTE — ASSESSMENT & PLAN NOTE
Atypical chest pain for 5 to 6 days following URI.  Pleuritic in nature on left chest.  Differential diagnosis favors pleuritic/pleurodynia following URI, angina, PE, musculoskeletal less likely given not reproducible.    EKG conducted in office: Sinus rhythm with heart rate of 68  , , QTc 439, normal axis  No ST changes or Q waves  General impression: Normal EKG    We will get D-dimer to rule out PE.  Otherwise conservative management with Tylenol, can consider further work-up/treatment if does not resolve within 1 to 2 weeks.

## 2021-10-27 NOTE — PROGRESS NOTES
"Subjective:     CC: Chest pain    HPI:   Ben is a very pleasant 46-year-old man with eosinophilic colitis however otherwise in good state of health.  He presents due to left-sided chest pain since last Wednesday after recovering from a URI.  Worse in the morning, and increases with deep breath.  Remains active, chest pain is nonexertional, nonreproducible.  Denies nausea/vomiting/diaphoresis/radiation of pain/shortness of breath.  He continues to work without issues    He works as a professor in genetics at [employer]    Problem   Atypical Chest Pain   Chest Pain On Breathing       No current [facility]-ordered outpatient medications on file.     No current [facility]-ordered facility-administered medications on file.           ROS:  Gen: no fevers/chills, no changes in weight  Eyes: no changes in vision  ENT: no sore throat, no hearing loss, no bloody nose  Pulm: no sob, no cough  CV: See HPI  GI: no nausea/vomiting, no diarrhea  : no dysuria  MSk: no myalgias  Skin: no rash  Neuro: no headaches, no numbness/tingling  Heme/Lymph: no easy bruising      Objective:     Exam:  /70 (BP Location: Left arm, Patient Position: Sitting, BP Cuff Size: Adult)   Pulse 86   Temp 36.6 °C (97.8 °F) (Temporal)   Ht 1.765 m (5' 9.5\")   Wt 74.4 kg (164 lb)   SpO2 97%   BMI 23.87 kg/m²  Body mass index is 23.87 kg/m².    Gen: Alert and oriented, No apparent distress.  Neck: Neck is supple without lymphadenopathy.  Lungs: Normal effort, CTA bilaterally, no wheezes, rhonchi, or rales  CV: Regular rate and rhythm. No murmurs, rubs, or gallops.  Chest: No tenderness to palpation  Ext: No clubbing, cyanosis, edema.        Assessment & Plan:     46 y.o. male with the following -     Problem List Items Addressed This Visit     Atypical chest pain     Atypical chest pain for 5 to 6 days following URI.  Pleuritic in nature on left chest.  Differential diagnosis favors pleuritic/pleurodynia following URI, angina, PE, musculoskeletal less likely " given not reproducible.    EKG conducted in office: Sinus rhythm with heart rate of 68  , , QTc 439, normal axis  No ST changes or Q waves  General impression: Normal EKG    We will get D-dimer to rule out PE.  Otherwise conservative management with Tylenol, can consider further work-up/treatment if does not resolve within 1 to 2 weeks.         Chest pain on breathing     See atypical chest pain         Relevant Orders    D-DIMER          No follow-ups on file.    Please note that this dictation was created using voice recognition software. I have made every reasonable attempt to correct obvious errors, but I expect that there are errors of grammar and possibly content that I did not discover before finalizing the note.

## 2021-10-28 ENCOUNTER — TELEPHONE (OUTPATIENT)
Dept: MEDICAL GROUP | Facility: MEDICAL CENTER | Age: 46
End: 2021-10-28

## 2021-10-28 NOTE — TELEPHONE ENCOUNTER
Called patient to see if he got D-dimer test done. He went to [id number] as [facility] not covered by his insurance.  They were not sure where to send the lab to apparently, this office has not yet received it.  Will have MA call [facility] to get result and follow-up

## 2022-02-01 ENCOUNTER — OFFICE VISIT (OUTPATIENT)
Dept: MEDICAL GROUP | Facility: MEDICAL CENTER | Age: 47
End: 2022-02-01
Payer: COMMERCIAL

## 2022-02-01 VITALS
SYSTOLIC BLOOD PRESSURE: 102 MMHG | DIASTOLIC BLOOD PRESSURE: 64 MMHG | HEIGHT: 70 IN | BODY MASS INDEX: 23.77 KG/M2 | TEMPERATURE: 97.9 F | HEART RATE: 79 BPM | OXYGEN SATURATION: 96 % | WEIGHT: 166 LBS

## 2022-02-01 DIAGNOSIS — H93.8X2 SENSATION OF FULLNESS IN LEFT EAR: ICD-10-CM

## 2022-02-01 PROCEDURE — 99213 OFFICE O/P EST LOW 20 MIN: CPT | Performed by: STUDENT IN AN ORGANIZED HEALTH CARE EDUCATION/TRAINING PROGRAM

## 2022-02-01 ASSESSMENT — PATIENT HEALTH QUESTIONNAIRE - PHQ9: CLINICAL INTERPRETATION OF PHQ2 SCORE: 0

## 2022-02-01 NOTE — PROGRESS NOTES
"Subjective:     CC: Left ear fullness    HPI:   Ben presents today with complaint of fullness, congestion and popping of his left ear.  About 2 weeks ago he and his family were sick with a URI (tested negative for Covid), since then his other symptoms have resolved however he feels a continued stuffiness in his left ear, some diminished hearing, will pop with moving his jaw.  Has tried carbamide peroxide drops a few times without much change      ROS:  Gen: no fevers/chills, no changes in weight  Eyes: no changes in vision  ENT: no sore throat, see HPI  Pulm: no sob, no cough  CV: no chest pain, no palpitations  GI: no nausea/vomiting, no diarrhea  : no dysuria  MSk: no myalgias  Skin: no rash  Neuro: no headaches, no numbness/tingling  Heme/Lymph: no easy bruising      Objective:     Exam:  /64 (BP Location: Right arm, Patient Position: Sitting, BP Cuff Size: Adult)   Pulse 79   Temp 36.6 °C (97.9 °F) (Temporal)   Ht 1.765 m (5' 9.5\")   Wt 75.3 kg (166 lb)   SpO2 96%   BMI 24.16 kg/m²  Body mass index is 24.16 kg/m².    Gen: Alert and oriented, No apparent distress.  ENT:    Ears appear grossly normal with normal contour, canals are clear and TMs normal bilaterally, no significant cerumen  Neck: Neck is supple without lymphadenopathy.  Lungs: Normal effort, CTA bilaterally, no wheezes, rhonchi, or rales  CV: Regular rate and rhythm. No murmurs, rubs, or gallops.  Ext: No clubbing, cyanosis, edema.      Assessment & Plan:     46 y.o. male with the following -     1. Sensation of fullness in left ear     Likely lingering symptom of his recent URI.  No cerumen impaction.  Recommend try antihistamines such as cetirizine as well as nasal fluticasone.  Can also try sinus rinse.  May take a few weeks to improve after infection.  If symptoms persist for a month we will consider ENT referral.    Please note that this dictation was created using voice recognition software. I have made every reasonable " attempt to correct obvious errors, but I expect that there are errors of grammar and possibly content that I did not discover before finalizing the note.

## 2022-08-31 ENCOUNTER — TELEPHONE (OUTPATIENT)
Dept: SCHEDULING | Facility: IMAGING CENTER | Age: 47
End: 2022-08-31

## 2022-09-02 ENCOUNTER — OFFICE VISIT (OUTPATIENT)
Dept: MEDICAL GROUP | Facility: MEDICAL CENTER | Age: 47
End: 2022-09-02
Payer: COMMERCIAL

## 2022-09-02 VITALS
HEIGHT: 70 IN | BODY MASS INDEX: 23.62 KG/M2 | DIASTOLIC BLOOD PRESSURE: 72 MMHG | HEART RATE: 84 BPM | WEIGHT: 165 LBS | SYSTOLIC BLOOD PRESSURE: 126 MMHG | TEMPERATURE: 97.7 F | RESPIRATION RATE: 16 BRPM | OXYGEN SATURATION: 98 %

## 2022-09-02 DIAGNOSIS — K52.82 EOSINOPHILIC COLITIS: ICD-10-CM

## 2022-09-02 DIAGNOSIS — Z00.00 WELLNESS EXAMINATION: ICD-10-CM

## 2022-09-02 DIAGNOSIS — K21.9 GASTROESOPHAGEAL REFLUX DISEASE WITHOUT ESOPHAGITIS: ICD-10-CM

## 2022-09-02 PROCEDURE — 99214 OFFICE O/P EST MOD 30 MIN: CPT | Performed by: STUDENT IN AN ORGANIZED HEALTH CARE EDUCATION/TRAINING PROGRAM

## 2022-09-02 NOTE — PROGRESS NOTES
"Subjective:     Chief Complaint   Patient presents with    Establish Care    Gastrophageal Reflux         HPI:   Ben presents today to establish care.    Alopecia  Patient with a chronic history of alopecia.  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  Patient no longer following with GI specialist.  He has been several years since patient has seen GI.    Health anxiety  Patient presents today noting that he has significant health anxiety.  Patient notes that he is always worried about his health or something with chronic recurring.  Patient notes that this for started after having kids.    Umbilical hernia  Patient with an umbilical hernia.  Patient notes previous inguinal hernia repair at age 12 and umbilical hernia repair at age 26.  Patient notes that umbilical hernia returned and has been unchanged.  No new concerns.    GERD  Patient presents today for concerns about acid reflux.  Patient notes that approximately 1 week ago he was having a lot of burping, burning in his throat and difficulty with swallowing.  He notes that his throat felt sore and he could feel the food all the way down his throat.  He notes that this has since improved.  Patient does take omeprazole regularly.  Patient notes that he drinks excessive amounts of coffee from morning to evening.  Has worked on reducing his coffee intake with relief of symptoms..       ROS:  Gen: no fevers/chills  Pulm: no sob, no cough  CV: no chest pain, no palpitations  GI: no nausea/vomiting, no diarrhea  Neuro: no headaches      Objective:     Exam:  /72 (BP Location: Right arm, Patient Position: Sitting, BP Cuff Size: Adult)   Pulse 84   Temp 36.5 °C (97.7 °F) (Temporal)   Resp 16   Ht 1.765 m (5' 9.5\")   Wt 74.8 kg (165 lb)   SpO2 98%   BMI 24.02 kg/m²  Body mass index is 24.02 kg/m².    Gen: Alert and oriented, No apparent distress.  Neck: Neck " is supple without lymphadenopathy.  Lungs: Normal effort, CTA bilaterally, no wheezes, rhonchi, or rales  CV: Regular rate and rhythm. No murmurs, rubs, or gallops.  Ext: No clubbing, cyanosis, edema.      Assessment & Plan:     47 y.o. male with the following -   1. Wellness examination  - Lipid Profile; Future  - Comp Metabolic Panel; Future  - VITAMIN D,25 HYDROXY (DEFICIENCY); Future  - TSH WITH REFLEX TO FT4; Future    2. Eosinophilic colitis  Chronic, stable.  Patient continues to monitor with diet.    3. Gastroesophageal reflux disease without esophagitis  Chronic, stable.  Patient advised to reduce caffeine intake.  Thorough discussion about GERD, symptoms & prevention.    Return in about 1 year (around 9/2/2023), or if symptoms worsen or fail to improve.    Please note that this dictation was created using voice recognition software. I have made every reasonable attempt to correct obvious errors, but I expect that there are errors of grammar and possibly content that I did not discover before finalizing the note.

## 2023-05-10 ENCOUNTER — TELEMEDICINE (OUTPATIENT)
Dept: MEDICAL GROUP | Facility: MEDICAL CENTER | Age: 48
End: 2023-05-10
Payer: COMMERCIAL

## 2023-05-10 VITALS — BODY MASS INDEX: 24.05 KG/M2 | WEIGHT: 168 LBS | HEIGHT: 70 IN | TEMPERATURE: 98.7 F

## 2023-05-10 DIAGNOSIS — U07.1 COVID-19: ICD-10-CM

## 2023-05-10 PROCEDURE — 99213 OFFICE O/P EST LOW 20 MIN: CPT | Mod: 95 | Performed by: STUDENT IN AN ORGANIZED HEALTH CARE EDUCATION/TRAINING PROGRAM

## 2023-05-10 RX ORDER — CODEINE PHOSPHATE AND GUAIFENESIN 10; 100 MG/5ML; MG/5ML
5 SOLUTION ORAL EVERY 4 HOURS PRN
Qty: 75 ML | Refills: 0 | Status: SHIPPED | OUTPATIENT
Start: 2023-05-10 | End: 2023-05-15

## 2023-05-10 ASSESSMENT — PATIENT HEALTH QUESTIONNAIRE - PHQ9: CLINICAL INTERPRETATION OF PHQ2 SCORE: 0

## 2023-05-10 NOTE — PROGRESS NOTES
"Virtual Visit: Established Patient   This visit was conducted via Zoom using secure and encrypted videoconferencing technology.   The patient was in their home in the state [facility].    The patient's identity was confirmed and verbal consent was obtained for this virtual visit.    Subjective:   CC:   Chief Complaint   Patient presents with    Coronavirus Screening     Ben Lackey is a 48 y.o. male presenting for evaluation and management of:    COVID 19 positive  - symptoms started on 5/6 fatigue  - congestion noted 5/7 rhinitis, cough  - increase mucus, dark brown   - covid test + 5/9/2023  - day 4/5 today.  - covid vaccinated   - on omeprazole  - has not tried OTC medication due to diet sensitivity  -     ROS       Current medicines (including changes today)  Current Outpatient Medications   Medication Sig Dispense Refill    guaifenesin-codeine (ROBITUSSIN AC) Solution oral solution Take 5 mL by mouth every four hours as needed for Cough for up to 5 days. 75 mL 0    Nirmatrelvir&Ritonavir 300/100 20 x 150 MG & 10 x 100MG Tablet Therapy Pack Take 300 mg nirmatrelvir (two 150 mg tablets) with 100 mg ritonavir (one 100 mg tablet) by mouth, with all three tablets taken together twice daily for 5 days. 30 Each 0     No current facility-administered medications for this visit.       Patient Active Problem List    Diagnosis Date Noted    Atypical chest pain 10/27/2021    Chest pain on breathing 10/27/2021    PAC (premature atrial contraction) 10/11/2018    Anxiety 12/27/2017    Chronic left-sided thoracic back pain 12/27/2017    Primary insomnia 07/11/2016    Eosinophilic colitis 07/11/2016        Objective:   Temp 37.1 °C (98.7 °F) Comment: pt reported  Ht 1.765 m (5' 9.5\")   Wt 76.2 kg (168 lb) Comment: pt reported  BMI 24.45 kg/m²    RR 12    Physical Exam:  Constitutional: Alert, no distress, well-groomed.  Skin: No rashes in visible areas.  Respiratory: Unlabored respiratory effort, no cough or audible " wheeze speaks in complete sentences   Face symmetric, speech fluent   Psych: Alert and oriented x3, normal affect and mood.     Assessment and Plan:   The following treatment plan was discussed:     1. COVID-19  - guaifenesin-codeine (ROBITUSSIN AC) Solution oral solution; Take 5 mL by mouth every four hours as needed for Cough for up to 5 days.  Dispense: 75 mL; Refill: 0  - Nirmatrelvir&Ritonavir 300/100 20 x 150 MG & 10 x 100MG Tablet Therapy Pack; Take 300 mg nirmatrelvir (two 150 mg tablets) with 100 mg ritonavir (one 100 mg tablet) by mouth, with all three tablets taken together twice daily for 5 days.  Dispense: 30 Each; Refill: 0    COVID 19- currently stable, discussed adverse effect and indications for paxlovid. Patient has significant cough in the evening that is preventing sleep. Discuss use of conservative management during day and robitussin ac in  evening as needed.     Follow-up: Return if symptoms worsen or fail to improve.

## 2023-10-21 ENCOUNTER — OFFICE VISIT (OUTPATIENT)
Dept: URGENT CARE | Facility: PHYSICIAN GROUP | Age: 48
End: 2023-10-21
Payer: COMMERCIAL

## 2023-10-21 ENCOUNTER — APPOINTMENT (OUTPATIENT)
Dept: RADIOLOGY | Facility: IMAGING CENTER | Age: 48
End: 2023-10-21
Attending: FAMILY MEDICINE
Payer: COMMERCIAL

## 2023-10-21 VITALS
HEIGHT: 69 IN | RESPIRATION RATE: 16 BRPM | TEMPERATURE: 99.6 F | SYSTOLIC BLOOD PRESSURE: 112 MMHG | BODY MASS INDEX: 24.44 KG/M2 | WEIGHT: 165 LBS | OXYGEN SATURATION: 98 % | HEART RATE: 90 BPM | DIASTOLIC BLOOD PRESSURE: 76 MMHG

## 2023-10-21 DIAGNOSIS — S23.41XA SPRAIN OF COSTAL CARTILAGE, INITIAL ENCOUNTER: ICD-10-CM

## 2023-10-21 PROCEDURE — 3078F DIAST BP <80 MM HG: CPT | Performed by: FAMILY MEDICINE

## 2023-10-21 PROCEDURE — 71101 X-RAY EXAM UNILAT RIBS/CHEST: CPT | Mod: TC,RT | Performed by: RADIOLOGY

## 2023-10-21 PROCEDURE — 3074F SYST BP LT 130 MM HG: CPT | Performed by: FAMILY MEDICINE

## 2023-10-21 PROCEDURE — 99214 OFFICE O/P EST MOD 30 MIN: CPT | Performed by: FAMILY MEDICINE

## 2023-10-21 PROCEDURE — 1126F AMNT PAIN NOTED NONE PRSNT: CPT | Performed by: FAMILY MEDICINE

## 2023-10-21 RX ORDER — ACETAMINOPHEN 500 MG
500 TABLET ORAL EVERY 8 HOURS PRN
Qty: 30 TABLET | Refills: 1 | Status: SHIPPED
Start: 2023-10-21 | End: 2024-02-05

## 2023-10-21 ASSESSMENT — ENCOUNTER SYMPTOMS
FEVER: 0
COUGH: 0
NAUSEA: 0
SORE THROAT: 0
MYALGIAS: 0
VOMITING: 0
CHILLS: 0
DIZZINESS: 0
SHORTNESS OF BREATH: 0

## 2023-10-21 ASSESSMENT — PAIN SCALES - GENERAL: PAINLEVEL: NO PAIN

## 2023-10-21 NOTE — PROGRESS NOTES
Subjective:   Ben Lackey is a 48 y.o. male who presents for Injury (Patient was at the Gym was doing leg presses he went down when he was coming up right side of ribs he heard a pop happened on Wednesday )        Rib Injury  This is a new (Reports right-sided rib pain, onset 3 days prior on 10/18/2023 when performing squats and hip sled extension with 250 pounds and experiencing sudden onset of pain and a pop in the right side of the ribs) problem. Episode onset: 10/18/2023. The problem occurs constantly. The problem has been unchanged. Pertinent negatives include no chills, coughing, fever, myalgias, nausea, rash, sore throat or vomiting. Exacerbated by: Direct pressure, movement. He has tried rest for the symptoms. The treatment provided no relief.     PMH:  has no past medical history on file.  MEDS:   Current Outpatient Medications:     acetaminophen (TYLENOL) 500 MG Tab, Take 1 Tablet by mouth every 8 hours as needed for Mild Pain or Moderate Pain., Disp: 30 Tablet, Rfl: 1    Nirmatrelvir&Ritonavir 300/100 20 x 150 MG & 10 x 100MG Tablet Therapy Pack, Take 300 mg nirmatrelvir (two 150 mg tablets) with 100 mg ritonavir (one 100 mg tablet) by mouth, with all three tablets taken together twice daily for 5 days. (Patient not taking: Reported on 10/21/2023), Disp: 30 Each, Rfl: 0  ALLERGIES:   Allergies   Allergen Reactions    Amoxicillin Hives    Pcn [Penicillins] Hives     SURGHX: History reviewed. No pertinent surgical history.  SOCHX:  reports that he quit smoking about 28 years ago. His smoking use included cigarettes. He has never used smokeless tobacco. He reports current alcohol use of about 4.8 oz of alcohol per week. He reports that he does not use drugs.  FH:   Family History   Problem Relation Age of Onset    Cancer Maternal Grandmother         colon    Heart Attack Paternal Grandfather     Hypertension Mother      Review of Systems   Constitutional:  Negative for chills and fever.   HENT:   "Negative for sore throat.    Respiratory:  Negative for cough and shortness of breath.    Gastrointestinal:  Negative for nausea and vomiting.   Musculoskeletal:  Negative for myalgias.   Skin:  Negative for rash.   Neurological:  Negative for dizziness.        Objective:   /76 (BP Location: Right arm, Patient Position: Sitting, BP Cuff Size: Adult)   Pulse 90   Temp 37.6 °C (99.6 °F) (Temporal)   Resp 16   Ht 1.753 m (5' 9\")   Wt 74.8 kg (165 lb)   SpO2 98%   BMI 24.37 kg/m²   Physical Exam  Vitals and nursing note reviewed.   Constitutional:       General: He is not in acute distress.     Appearance: He is well-developed.   HENT:      Head: Normocephalic and atraumatic.      Right Ear: External ear normal.      Left Ear: External ear normal.      Nose: Nose normal.      Mouth/Throat:      Mouth: Mucous membranes are moist.   Eyes:      Conjunctiva/sclera: Conjunctivae normal.   Cardiovascular:      Rate and Rhythm: Normal rate.   Pulmonary:      Effort: Pulmonary effort is normal. No respiratory distress.      Breath sounds: Normal breath sounds.   Chest:       Abdominal:      General: There is no distension.   Musculoskeletal:         General: Normal range of motion.   Skin:     General: Skin is warm and dry.   Neurological:      General: No focal deficit present.      Mental Status: He is alert and oriented to person, place, and time. Mental status is at baseline.      Gait: Gait (gait at baseline) normal.   Psychiatric:         Judgment: Judgment normal.       [id number] (WITH 1-VIEW CXR) RIGHT  Order: [id number]  Status: Final result     Visible to patient: Yes (not seen)     Next appt: None     Dx: Sprain of costal cartilage, initial e...     0 Result Notes  Details    Reading Physician Reading Date Result Priority   Ben Chavez M.D.  [phone] 10/21/2023 Urgent Care     Narrative & Impression     10/21/2023 2:51 PM     HISTORY/REASON FOR EXAM:  Right lower rib pain after right rib " injury.        TECHNIQUE/EXAM DESCRIPTION AND NUMBER OF VIEWS:  5 images of the right ribs and chest.     COMPARISON: NONE     FINDINGS:  No fractures or acute bony changes are noted.  There is no evidence of a hemo or pneumothorax.     IMPRESSION:     Normal rib series.           Exam Ended: 10/21/23  3:10 PM Last Resulted: 10/21/23  3:16 PM                  Assessment/Plan:   1. Sprain of costal cartilage, initial encounter  - [id number] (WITH 1-VIEW CXR) RIGHT; Future  - acetaminophen (TYLENOL) 500 MG Tab; Take 1 Tablet by mouth every 8 hours as needed for Mild Pain or Moderate Pain.  Dispense: 30 Tablet; Refill: 1        Medical Decision Making/Course:  In the course of preparing for this visit with review of the pertinent past medical history, recent and past clinic visits, current medications, and performing chart, immunization, medical history and medication reconciliation, and in the further course of obtaining the current history pertinent to the clinic visit today, performing an exam and evaluation, ordering and independently evaluating labs, tests including independent interpretation evaluation of x-ray imaging, and/or procedures, prescribing any recommended new medications as noted above, providing any pertinent counseling and education and recommending further coordination of care, at least  33 minutes of total time were spent during this encounter.      Discussed close monitoring, return precautions, and supportive measures of maintaining adequate fluid hydration and caloric intake, relative rest and symptom management as needed for pain and/or fever.    Differential diagnosis, natural history, supportive care, and indications for immediate follow-up discussed.     Advised the patient to follow-up with the primary care physician for recheck, reevaluation, and consideration of further management.    Please note that this dictation was created using voice recognition software. I have worked with  consultants from the vendor as well as technical experts from [facility] to optimize the interface. I have made every reasonable attempt to correct obvious errors, but I expect that there are errors of grammar and possibly content that I did not discover before finalizing the note.

## 2023-11-01 ENCOUNTER — TELEPHONE (OUTPATIENT)
Dept: URGENT CARE | Facility: CLINIC | Age: 48
End: 2023-11-01

## 2023-11-01 ENCOUNTER — OFFICE VISIT (OUTPATIENT)
Dept: URGENT CARE | Facility: CLINIC | Age: 48
End: 2023-11-01
Payer: COMMERCIAL

## 2023-11-01 VITALS
WEIGHT: 170 LBS | TEMPERATURE: 99.3 F | SYSTOLIC BLOOD PRESSURE: 112 MMHG | HEART RATE: 95 BPM | OXYGEN SATURATION: 98 % | RESPIRATION RATE: 16 BRPM | DIASTOLIC BLOOD PRESSURE: 68 MMHG | BODY MASS INDEX: 25.18 KG/M2 | HEIGHT: 69 IN

## 2023-11-01 DIAGNOSIS — R05.1 ACUTE COUGH: ICD-10-CM

## 2023-11-01 DIAGNOSIS — S29.9XXA RIB INJURY: ICD-10-CM

## 2023-11-01 PROCEDURE — 3078F DIAST BP <80 MM HG: CPT | Performed by: FAMILY MEDICINE

## 2023-11-01 PROCEDURE — 3074F SYST BP LT 130 MM HG: CPT | Performed by: FAMILY MEDICINE

## 2023-11-01 PROCEDURE — 99213 OFFICE O/P EST LOW 20 MIN: CPT | Performed by: FAMILY MEDICINE

## 2023-11-01 RX ORDER — CODEINE PHOSPHATE AND GUAIFENESIN 10; 100 MG/5ML; MG/5ML
5 SOLUTION ORAL EVERY 4 HOURS PRN
Qty: 420 ML | Refills: 0 | Status: SHIPPED | OUTPATIENT
Start: 2023-11-01 | End: 2023-11-11

## 2023-11-01 RX ORDER — BENZONATATE 100 MG/1
100 CAPSULE ORAL 3 TIMES DAILY PRN
Qty: 60 CAPSULE | Refills: 0 | Status: SHIPPED
Start: 2023-11-01 | End: 2024-02-05

## 2023-11-01 ASSESSMENT — ENCOUNTER SYMPTOMS
COUGH: 1
CONSTITUTIONAL NEGATIVE: 1
GASTROINTESTINAL NEGATIVE: 1

## 2023-11-01 NOTE — TELEPHONE ENCOUNTER
Hello , patient called and no pharmacy has the medication in stock, would like you to send in alternative.     Thank you!

## 2023-11-01 NOTE — PROGRESS NOTES
"Subjective:   Ben Lackey is a 48 y.o. male who presents for Rib Injury (X2 weeks ago had x-ray, no breaks, but pt states he is still having pain)  Re injured the area catch one of his kids jumping off the playground jungle gym.  He also developed a cough which prevents him from sleeping because of the pain    Rib Injury  Associated symptoms include chest pain and coughing.       Review of Systems   Constitutional: Negative.    HENT: Negative.     Respiratory:  Positive for cough.    Cardiovascular:  Positive for chest pain.   Gastrointestinal: Negative.    Genitourinary: Negative.        Medications, Allergies, and current problem list reviewed today in Epic.     Objective:     /68 (BP Location: Right arm, Patient Position: Sitting, BP Cuff Size: Adult)   Pulse 95   Temp 37.4 °C (99.3 °F) (Temporal)   Resp 16   Ht 1.753 m (5' 9\")   Wt 77.1 kg (170 lb)   SpO2 98%     Physical Exam  Vitals and nursing note reviewed.   Constitutional:       Appearance: Normal appearance.   HENT:      Head: Normocephalic and atraumatic.      Right Ear: Tympanic membrane normal.      Nose: Nose normal.      Mouth/Throat:      Pharynx: Oropharynx is clear.   Cardiovascular:      Rate and Rhythm: Normal rate and regular rhythm.      Pulses: Normal pulses.      Heart sounds: Normal heart sounds.   Pulmonary:      Effort: Pulmonary effort is normal.      Breath sounds: Normal breath sounds.      Comments: Right sided rib pain, no bruising today  Abdominal:      General: Abdomen is flat. Bowel sounds are normal.      Palpations: Abdomen is soft.   Neurological:      Mental Status: He is alert.         Assessment/Plan:     Diagnosis and associated orders:     1. Rib injury        2. Acute cough  guaifenesin-codeine (ROBITUSSIN AC) Solution oral solution         Comments/MDM:              Differential diagnosis, natural history, supportive care, and indications for immediate follow-up discussed.    Advised the patient to " follow-up with the primary care physician for recheck, reevaluation, and consideration of further management.    Please note that this dictation was created using voice recognition software. I have made a reasonable attempt to correct obvious errors, but I expect that there are errors of grammar and possibly content that I did not discover before finalizing the note.    This note was electronically signed by Steve Guerra M.D.

## 2024-01-10 ENCOUNTER — DOCUMENTATION (OUTPATIENT)
Dept: HEALTH INFORMATION MANAGEMENT | Facility: OTHER | Age: 49
End: 2024-01-10
Payer: COMMERCIAL

## 2024-02-05 ENCOUNTER — OFFICE VISIT (OUTPATIENT)
Dept: URGENT CARE | Facility: CLINIC | Age: 49
End: 2024-02-05
Payer: COMMERCIAL

## 2024-02-05 VITALS
TEMPERATURE: 99.9 F | RESPIRATION RATE: 18 BRPM | WEIGHT: 158 LBS | OXYGEN SATURATION: 95 % | BODY MASS INDEX: 23.4 KG/M2 | DIASTOLIC BLOOD PRESSURE: 74 MMHG | SYSTOLIC BLOOD PRESSURE: 116 MMHG | HEART RATE: 93 BPM | HEIGHT: 69 IN

## 2024-02-05 DIAGNOSIS — J10.1 INFLUENZA A: Primary | ICD-10-CM

## 2024-02-05 LAB
FLUAV RNA SPEC QL NAA+PROBE: POSITIVE
FLUBV RNA SPEC QL NAA+PROBE: NEGATIVE
RSV RNA SPEC QL NAA+PROBE: NEGATIVE
SARS-COV-2 RNA RESP QL NAA+PROBE: NEGATIVE

## 2024-02-05 PROCEDURE — 99213 OFFICE O/P EST LOW 20 MIN: CPT | Performed by: PHYSICIAN ASSISTANT

## 2024-02-05 PROCEDURE — 3078F DIAST BP <80 MM HG: CPT | Performed by: PHYSICIAN ASSISTANT

## 2024-02-05 PROCEDURE — 0241U POCT CEPHEID COV-2, FLU A/B, RSV - PCR: CPT | Performed by: PHYSICIAN ASSISTANT

## 2024-02-05 PROCEDURE — 3074F SYST BP LT 130 MM HG: CPT | Performed by: PHYSICIAN ASSISTANT

## 2024-02-05 RX ORDER — BENZONATATE 100 MG/1
100 CAPSULE ORAL 3 TIMES DAILY PRN
Qty: 21 CAPSULE | Refills: 0 | Status: SHIPPED | OUTPATIENT
Start: 2024-02-05

## 2024-02-05 RX ORDER — DEXTROMETHORPHAN HYDROBROMIDE AND PROMETHAZINE HYDROCHLORIDE 15; 6.25 MG/5ML; MG/5ML
5 SYRUP ORAL NIGHTLY PRN
Qty: 118 ML | Refills: 0 | Status: SHIPPED | OUTPATIENT
Start: 2024-02-05

## 2024-02-05 NOTE — PROGRESS NOTES
"Subjective:   Ben Lackey is a 48 y.o. male who presents for Cough (X Saturday having cough, fever, bodyaches)      HPI  The patient presents to the Urgent Care with complaints of a cough onset 4 nights ago.  He had a raspy voice for about a week and then the cough started.  Cough is occasionally productive.  He had negative COVID test x 2 at home.  Fever 101F last night but he is unsure if his thermometer is accurate.  He did take Tylenol this morning.  He states he does not take NSAIDs.  Denies any chest pain, shortness of breath, vomiting, or diarrhea.  Does report a history of pneumonia in 2000.  He is otherwise healthy.  Non-smoker.            History reviewed. No pertinent past medical history.  Allergies   Allergen Reactions    Amoxicillin Hives    Pcn [Penicillins] Hives        Objective:     /74 (BP Location: Left arm, Patient Position: Sitting, BP Cuff Size: Adult)   Pulse 93   Temp 37.7 °C (99.9 °F) (Temporal)   Resp 18   Ht 1.753 m (5' 9\")   Wt 71.7 kg (158 lb)   SpO2 95%   BMI 23.33 kg/m²     Physical Exam  Vitals reviewed.   Constitutional:       General: He is not in acute distress.     Appearance: Normal appearance. He is not ill-appearing or toxic-appearing.   HENT:      Head: Normocephalic.      Mouth/Throat:      Mouth: Mucous membranes are moist.      Pharynx: Oropharynx is clear. Uvula midline. Posterior oropharyngeal erythema (trace) present. No oropharyngeal exudate.      Tonsils: No tonsillar exudate or tonsillar abscesses.   Eyes:      Conjunctiva/sclera: Conjunctivae normal.      Pupils: Pupils are equal, round, and reactive to light.   Cardiovascular:      Rate and Rhythm: Normal rate and regular rhythm.      Heart sounds: Normal heart sounds.   Pulmonary:      Effort: Pulmonary effort is normal. No respiratory distress.      Breath sounds: Normal breath sounds. No wheezing, rhonchi or rales.   Musculoskeletal:      Cervical back: Neck supple. No rigidity. "   Lymphadenopathy:      Cervical: No cervical adenopathy.   Skin:     General: Skin is warm and dry.   Neurological:      General: No focal deficit present.      Mental Status: He is alert and oriented to person, place, and time.   Psychiatric:         Mood and Affect: Mood normal.         Behavior: Behavior normal.       Results for orders placed or performed in visit on 02/05/24   POCT CoV-2, Flu A/B, RSV by PCR   Result Value Ref Range    SARS-CoV-2 by PCR Negative Negative, Invalid    Influenza virus A RNA Positive (A) Negative, Invalid    Influenza virus B, PCR Negative Negative, Invalid    RSV, PCR Negative Negative, Invalid     Diagnosis and associated orders:     1. Influenza A  - POCT CoV-2, Flu A/B, RSV by PCR  - benzonatate (TESSALON) 100 MG Cap; Take 1 Capsule by mouth 3 times a day as needed for Cough.  Dispense: 21 Capsule; Refill: 0  - promethazine-dextromethorphan (PROMETHAZINE-DM) 6.25-15 MG/5ML syrup; Take 5 mL by mouth at bedtime as needed for Cough.  Dispense: 118 mL; Refill: 0       Comments/MDM:     Symptomatic and supportive care:   Plenty of oral hydration and rest   Over the counter cough suppressant as directed.  Tylenol or ibuprofen for pain and fever as directed.   Warm salt water gargles for sore throat, soft foods, cool liquids.   Saline nasal spray and Flonase  Infection control measures at home. Stay away from people, Hand washing, covering sneeze/cough, disinfect surfaces.   Remain home from work, school, and other populated environments.    Overall, the patient is well-appearing. They are not hypoxic, afebrile, and a normal pulmonary exam.       I personally reviewed prior external notes and test results pertinent to today's visit. Pathogenesis of diagnosis discussed including typical length and natural progression. Supportive care, natural history, differential diagnoses, and indications for immediate follow-up discussed. Patient expresses understanding and agrees to plan. Patient  denies any other questions or concerns.     Follow-up with the primary care physician for recheck, reevaluation, and consideration of further management.    Please note that this dictation was created using voice recognition software. I have made a reasonable attempt to correct obvious errors, but I expect that there are errors of grammar and possibly content that I did not discover before finalizing the note.    This note was electronically signed by Gallito Baez PA-C

## 2024-03-13 ENCOUNTER — TELEPHONE (OUTPATIENT)
Dept: HEALTH INFORMATION MANAGEMENT | Facility: OTHER | Age: 49
End: 2024-03-13
Payer: COMMERCIAL

## 2025-03-31 ENCOUNTER — RESULTS FOLLOW-UP (OUTPATIENT)
Dept: URGENT CARE | Facility: CLINIC | Age: 50
End: 2025-03-31

## 2025-03-31 ENCOUNTER — OFFICE VISIT (OUTPATIENT)
Dept: URGENT CARE | Facility: CLINIC | Age: 50
End: 2025-03-31
Payer: COMMERCIAL

## 2025-03-31 VITALS
OXYGEN SATURATION: 99 % | RESPIRATION RATE: 16 BRPM | HEART RATE: 80 BPM | SYSTOLIC BLOOD PRESSURE: 122 MMHG | TEMPERATURE: 97.6 F | DIASTOLIC BLOOD PRESSURE: 82 MMHG

## 2025-03-31 DIAGNOSIS — R06.2 SYMPTOM OF WHEEZING: ICD-10-CM

## 2025-03-31 DIAGNOSIS — J06.9 VIRAL URI WITH COUGH: ICD-10-CM

## 2025-03-31 LAB
FLUAV RNA SPEC QL NAA+PROBE: NEGATIVE
FLUBV RNA SPEC QL NAA+PROBE: NEGATIVE
RSV RNA SPEC QL NAA+PROBE: NEGATIVE
S PYO DNA SPEC NAA+PROBE: NOT DETECTED
SARS-COV-2 RNA RESP QL NAA+PROBE: NEGATIVE

## 2025-03-31 RX ORDER — METHYLPREDNISOLONE 4 MG/1
TABLET ORAL
Qty: 21 TABLET | Refills: 0 | Status: SHIPPED | OUTPATIENT
Start: 2025-03-31

## 2025-03-31 ASSESSMENT — ENCOUNTER SYMPTOMS
FEVER: 0
HEADACHES: 0
COUGH: 1
PALPITATIONS: 0
CHILLS: 0
SORE THROAT: 1
WHEEZING: 1
DIZZINESS: 0
SHORTNESS OF BREATH: 0

## 2025-03-31 NOTE — PROGRESS NOTES
Subjective     Ben Lackey is a 50 y.o. male who presents with Cough (X3days, cough, worse at night, lost voice)            Ben is a 50 y.o. male who presents to urgent care with cough, sore throat and hoarse voice.  Symptoms started approximately 3 days ago and worsened last night.  No fever/chills.  He does report some wheezing.  Patient states that multiple family members in his household have been sick over the last couple weeks.  Patient is supposed to travel to use his parents later this week and has trouble paperwork that he is requesting that I fill out so that he can get a refund on his flight through travel insurance.    Cough  This is a new problem. The current episode started in the past 7 days. The cough is Productive of sputum. Associated symptoms include a sore throat and wheezing. Pertinent negatives include no chest pain, chills, ear pain, fever, headaches or shortness of breath.       Review of Systems   Constitutional:  Positive for malaise/fatigue. Negative for chills and fever.   HENT:  Positive for congestion and sore throat. Negative for ear pain.    Respiratory:  Positive for cough and wheezing. Negative for shortness of breath.    Cardiovascular:  Negative for chest pain and palpitations.   Neurological:  Negative for dizziness and headaches.   All other systems reviewed and are negative.             Objective     /82 (BP Location: Left arm, Patient Position: Sitting, BP Cuff Size: Adult)   Pulse 80   Temp 36.4 °C (97.6 °F) (Temporal)   Resp 16   SpO2 99%      Physical Exam  Vitals reviewed.   Constitutional:       General: He is not in acute distress.     Appearance: Normal appearance. He is not toxic-appearing.   HENT:      Head: Normocephalic and atraumatic.      Nose: Congestion present.      Mouth/Throat:      Lips: Pink.      Mouth: Mucous membranes are moist.      Pharynx: Oropharynx is clear. Uvula midline. Posterior oropharyngeal erythema present.   Eyes:       Extraocular Movements: Extraocular movements intact.      Conjunctiva/sclera: Conjunctivae normal.      Pupils: Pupils are equal, round, and reactive to light.   Cardiovascular:      Rate and Rhythm: Normal rate.   Pulmonary:      Effort: Pulmonary effort is normal. No respiratory distress.      Breath sounds: Normal breath sounds. No stridor. No wheezing, rhonchi or rales.   Skin:     General: Skin is warm and dry.   Neurological:      General: No focal deficit present.      Mental Status: He is alert and oriented to person, place, and time.                                  Assessment & Plan  Viral URI with cough    Orders:    POCT CoV-2, Flu A/B, RSV by PCR    POCT CEPHEID GROUP A STREP - PCR    Symptom of wheezing  - Declined Rx for albuterol inhaler.    Orders:    methylPREDNISolone (MEDROL DOSEPAK) 4 MG Tablet Therapy Pack; Follow schedule on package instructions.           Patient did request that I complete paperwork in order for him to get a refund on his flight as he does have travel coming up later this week.  Paperwork was completed and scanned into patient's chart.      Differential diagnoses, supportive care measures and indications for immediate follow-up discussed with patient. Pathogenesis of diagnosis discussed including typical length and natural progression.      Instructed to return to urgent care or nearest emergency department if symptoms fail to improve, for any change in condition, further concerns, or new concerning symptoms.    Patient states understanding and agrees with the plan of care and discharge instructions.           My total time spent caring for the patient on the day of the encounter was 30 minutes including obtaining patient history, physical exam, discussing differential diagnosis, plan of care, supportive care, appropriate follow up and indications for immediate follow up, completing requested documentation/paperwork for travel.This does not include time spent on separately  billable procedures/tests.

## 2025-07-09 ENCOUNTER — APPOINTMENT (OUTPATIENT)
Dept: MEDICAL GROUP | Facility: MEDICAL CENTER | Age: 50
End: 2025-07-09
Payer: COMMERCIAL

## 2025-08-25 ENCOUNTER — OFFICE VISIT (OUTPATIENT)
Dept: MEDICAL GROUP | Facility: MEDICAL CENTER | Age: 50
End: 2025-08-25
Payer: COMMERCIAL

## 2025-08-25 VITALS
HEIGHT: 69 IN | HEART RATE: 85 BPM | WEIGHT: 161.93 LBS | TEMPERATURE: 99.4 F | BODY MASS INDEX: 23.98 KG/M2 | DIASTOLIC BLOOD PRESSURE: 70 MMHG | OXYGEN SATURATION: 97 % | SYSTOLIC BLOOD PRESSURE: 114 MMHG

## 2025-08-25 DIAGNOSIS — Z13.6 ENCOUNTER FOR SCREENING FOR CARDIOVASCULAR DISORDERS: ICD-10-CM

## 2025-08-25 DIAGNOSIS — Z13.1 ENCOUNTER FOR SCREENING FOR DIABETES MELLITUS: ICD-10-CM

## 2025-08-25 DIAGNOSIS — L63.9 ALOPECIA AREATA: ICD-10-CM

## 2025-08-25 DIAGNOSIS — Z01.84 IMMUNITY STATUS TESTING: ICD-10-CM

## 2025-08-25 DIAGNOSIS — K52.831 COLLAGENOUS COLITIS: Primary | ICD-10-CM

## 2025-08-25 PROCEDURE — 3074F SYST BP LT 130 MM HG: CPT | Performed by: HEALTH CARE PROVIDER

## 2025-08-25 PROCEDURE — 3078F DIAST BP <80 MM HG: CPT | Performed by: HEALTH CARE PROVIDER

## 2025-08-25 PROCEDURE — 99214 OFFICE O/P EST MOD 30 MIN: CPT | Performed by: HEALTH CARE PROVIDER

## 2025-08-25 ASSESSMENT — PATIENT HEALTH QUESTIONNAIRE - PHQ9: CLINICAL INTERPRETATION OF PHQ2 SCORE: 0

## 2025-08-25 ASSESSMENT — LIFESTYLE VARIABLES
HOW OFTEN DO YOU HAVE A DRINK CONTAINING ALCOHOL: 2-3 TIMES A WEEK
AUDIT-C TOTAL SCORE: 4
HOW OFTEN DO YOU HAVE SIX OR MORE DRINKS ON ONE OCCASION: NEVER
SKIP TO QUESTIONS 9-10: 0
HOW MANY STANDARD DRINKS CONTAINING ALCOHOL DO YOU HAVE ON A TYPICAL DAY: 3 OR 4

## 2025-08-27 ENCOUNTER — HOSPITAL ENCOUNTER (EMERGENCY)
Facility: MEDICAL CENTER | Age: 50
End: 2025-08-27
Attending: STUDENT IN AN ORGANIZED HEALTH CARE EDUCATION/TRAINING PROGRAM
Payer: COMMERCIAL

## 2025-08-27 ENCOUNTER — OFFICE VISIT (OUTPATIENT)
Dept: URGENT CARE | Facility: CLINIC | Age: 50
End: 2025-08-27
Payer: COMMERCIAL

## 2025-08-27 ENCOUNTER — APPOINTMENT (OUTPATIENT)
Dept: RADIOLOGY | Facility: MEDICAL CENTER | Age: 50
End: 2025-08-27
Attending: STUDENT IN AN ORGANIZED HEALTH CARE EDUCATION/TRAINING PROGRAM
Payer: COMMERCIAL

## 2025-08-27 VITALS
HEIGHT: 69 IN | RESPIRATION RATE: 14 BRPM | HEART RATE: 83 BPM | SYSTOLIC BLOOD PRESSURE: 110 MMHG | TEMPERATURE: 97.8 F | OXYGEN SATURATION: 98 % | DIASTOLIC BLOOD PRESSURE: 70 MMHG | WEIGHT: 161 LBS | BODY MASS INDEX: 23.85 KG/M2

## 2025-08-27 VITALS
WEIGHT: 158.51 LBS | HEIGHT: 69 IN | OXYGEN SATURATION: 90 % | DIASTOLIC BLOOD PRESSURE: 71 MMHG | BODY MASS INDEX: 23.48 KG/M2 | SYSTOLIC BLOOD PRESSURE: 138 MMHG | RESPIRATION RATE: 19 BRPM | TEMPERATURE: 99.5 F | HEART RATE: 74 BPM

## 2025-08-27 DIAGNOSIS — N50.812 LEFT TESTICULAR PAIN: ICD-10-CM

## 2025-08-27 DIAGNOSIS — R10.32 LLQ PAIN: ICD-10-CM

## 2025-08-27 DIAGNOSIS — R10.30 LOWER ABDOMINAL PAIN: ICD-10-CM

## 2025-08-27 DIAGNOSIS — N50.812 PAIN IN BOTH TESTICLES: ICD-10-CM

## 2025-08-27 DIAGNOSIS — N50.811 PAIN IN BOTH TESTICLES: ICD-10-CM

## 2025-08-27 DIAGNOSIS — N45.1 EPIDIDYMITIS: Primary | ICD-10-CM

## 2025-08-27 DIAGNOSIS — R10.9 LEFT SIDED ABDOMINAL PAIN: ICD-10-CM

## 2025-08-27 DIAGNOSIS — R50.9 FEVER, UNSPECIFIED FEVER CAUSE: ICD-10-CM

## 2025-08-27 LAB
ALBUMIN SERPL BCP-MCNC: 4.6 G/DL (ref 3.2–4.9)
ALBUMIN/GLOB SERPL: 1.7 G/DL
ALP SERPL-CCNC: 68 U/L (ref 30–99)
ALT SERPL-CCNC: 23 U/L (ref 2–50)
ANION GAP SERPL CALC-SCNC: 12 MMOL/L (ref 7–16)
APPEARANCE UR: CLEAR
APPEARANCE UR: CLEAR
AST SERPL-CCNC: 25 U/L (ref 12–45)
BASOPHILS # BLD AUTO: 0.8 % (ref 0–1.8)
BASOPHILS # BLD: 0.06 K/UL (ref 0–0.12)
BILIRUB SERPL-MCNC: 0.5 MG/DL (ref 0.1–1.5)
BILIRUB UR QL STRIP.AUTO: NEGATIVE
BILIRUB UR STRIP-MCNC: NEGATIVE MG/DL
BUN SERPL-MCNC: 7 MG/DL (ref 8–22)
CALCIUM ALBUM COR SERPL-MCNC: 8.8 MG/DL (ref 8.5–10.5)
CALCIUM SERPL-MCNC: 9.3 MG/DL (ref 8.5–10.5)
CHLORIDE SERPL-SCNC: 97 MMOL/L (ref 96–112)
CO2 SERPL-SCNC: 24 MMOL/L (ref 20–33)
COLOR UR AUTO: YELLOW
COLOR UR: YELLOW
CREAT SERPL-MCNC: 1.12 MG/DL (ref 0.5–1.4)
EOSINOPHIL # BLD AUTO: 0.13 K/UL (ref 0–0.51)
EOSINOPHIL NFR BLD: 1.6 % (ref 0–6.9)
ERYTHROCYTE [DISTWIDTH] IN BLOOD BY AUTOMATED COUNT: 36.9 FL (ref 35.9–50)
GFR SERPLBLD CREATININE-BSD FMLA CKD-EPI: 81 ML/MIN/1.73 M 2
GLOBULIN SER CALC-MCNC: 2.7 G/DL (ref 1.9–3.5)
GLUCOSE SERPL-MCNC: 117 MG/DL (ref 65–99)
GLUCOSE UR STRIP.AUTO-MCNC: NEGATIVE MG/DL
GLUCOSE UR STRIP.AUTO-MCNC: NEGATIVE MG/DL
HCT VFR BLD AUTO: 44.8 % (ref 42–52)
HGB BLD-MCNC: 15.6 G/DL (ref 14–18)
IMM GRANULOCYTES # BLD AUTO: 0.02 K/UL (ref 0–0.11)
IMM GRANULOCYTES NFR BLD AUTO: 0.3 % (ref 0–0.9)
KETONES UR STRIP.AUTO-MCNC: NEGATIVE MG/DL
KETONES UR STRIP.AUTO-MCNC: NEGATIVE MG/DL
LEUKOCYTE ESTERASE UR QL STRIP.AUTO: NEGATIVE
LEUKOCYTE ESTERASE UR QL STRIP.AUTO: NEGATIVE
LIPASE SERPL-CCNC: 62 U/L (ref 11–82)
LYMPHOCYTES # BLD AUTO: 1.62 K/UL (ref 1–4.8)
LYMPHOCYTES NFR BLD: 20.5 % (ref 22–41)
MCH RBC QN AUTO: 30.8 PG (ref 27–33)
MCHC RBC AUTO-ENTMCNC: 34.8 G/DL (ref 32.3–36.5)
MCV RBC AUTO: 88.4 FL (ref 81.4–97.8)
MICRO URNS: NORMAL
MONOCYTES # BLD AUTO: 0.62 K/UL (ref 0–0.85)
MONOCYTES NFR BLD AUTO: 7.9 % (ref 0–13.4)
NEUTROPHILS # BLD AUTO: 5.44 K/UL (ref 1.82–7.42)
NEUTROPHILS NFR BLD: 68.9 % (ref 44–72)
NITRITE UR QL STRIP.AUTO: NEGATIVE
NITRITE UR QL STRIP.AUTO: NEGATIVE
NRBC # BLD AUTO: 0 K/UL
NRBC BLD-RTO: 0 /100 WBC (ref 0–0.2)
PH UR STRIP.AUTO: 7 [PH] (ref 5–8)
PH UR STRIP.AUTO: 7 [PH] (ref 5–8)
PLATELET # BLD AUTO: 265 K/UL (ref 164–446)
PMV BLD AUTO: 9.2 FL (ref 9–12.9)
POTASSIUM SERPL-SCNC: 4 MMOL/L (ref 3.6–5.5)
PROT SERPL-MCNC: 7.3 G/DL (ref 6–8.2)
PROT UR QL STRIP: NEGATIVE MG/DL
PROT UR QL STRIP: NEGATIVE MG/DL
RBC # BLD AUTO: 5.07 M/UL (ref 4.7–6.1)
RBC UR QL AUTO: NEGATIVE
RBC UR QL AUTO: NEGATIVE
SODIUM SERPL-SCNC: 133 MMOL/L (ref 135–145)
SP GR UR STRIP.AUTO: 1
SP GR UR STRIP.AUTO: 1.01
UROBILINOGEN UR STRIP-MCNC: 0.2 MG/DL
UROBILINOGEN UR STRIP.AUTO-MCNC: 0.2 EU/DL
WBC # BLD AUTO: 7.9 K/UL (ref 4.8–10.8)

## 2025-08-27 PROCEDURE — 81003 URINALYSIS AUTO W/O SCOPE: CPT

## 2025-08-27 PROCEDURE — A9270 NON-COVERED ITEM OR SERVICE: HCPCS | Performed by: STUDENT IN AN ORGANIZED HEALTH CARE EDUCATION/TRAINING PROGRAM

## 2025-08-27 PROCEDURE — 74177 CT ABD & PELVIS W/CONTRAST: CPT

## 2025-08-27 PROCEDURE — 96375 TX/PRO/DX INJ NEW DRUG ADDON: CPT

## 2025-08-27 PROCEDURE — 700102 HCHG RX REV CODE 250 W/ 637 OVERRIDE(OP): Performed by: STUDENT IN AN ORGANIZED HEALTH CARE EDUCATION/TRAINING PROGRAM

## 2025-08-27 PROCEDURE — 700117 HCHG RX CONTRAST REV CODE 255: Performed by: STUDENT IN AN ORGANIZED HEALTH CARE EDUCATION/TRAINING PROGRAM

## 2025-08-27 PROCEDURE — 83690 ASSAY OF LIPASE: CPT

## 2025-08-27 PROCEDURE — 700111 HCHG RX REV CODE 636 W/ 250 OVERRIDE (IP): Mod: JZ | Performed by: STUDENT IN AN ORGANIZED HEALTH CARE EDUCATION/TRAINING PROGRAM

## 2025-08-27 PROCEDURE — 99285 EMERGENCY DEPT VISIT HI MDM: CPT

## 2025-08-27 PROCEDURE — 96374 THER/PROPH/DIAG INJ IV PUSH: CPT

## 2025-08-27 PROCEDURE — 36415 COLL VENOUS BLD VENIPUNCTURE: CPT

## 2025-08-27 PROCEDURE — 76870 US EXAM SCROTUM: CPT

## 2025-08-27 PROCEDURE — 80053 COMPREHEN METABOLIC PANEL: CPT

## 2025-08-27 PROCEDURE — 85025 COMPLETE CBC W/AUTO DIFF WBC: CPT

## 2025-08-27 RX ORDER — LEVOFLOXACIN 500 MG/1
500 TABLET, FILM COATED ORAL DAILY
Qty: 10 TABLET | Refills: 0 | Status: ACTIVE | OUTPATIENT
Start: 2025-08-27 | End: 2025-09-06

## 2025-08-27 RX ORDER — LEVOFLOXACIN 500 MG/1
500 TABLET, FILM COATED ORAL ONCE
Status: COMPLETED | OUTPATIENT
Start: 2025-08-27 | End: 2025-08-27

## 2025-08-27 RX ORDER — MORPHINE SULFATE 4 MG/ML
4 INJECTION INTRAVENOUS ONCE
Status: COMPLETED | OUTPATIENT
Start: 2025-08-27 | End: 2025-08-27

## 2025-08-27 RX ORDER — OXYCODONE AND ACETAMINOPHEN 5; 325 MG/1; MG/1
1 TABLET ORAL EVERY 4 HOURS PRN
Qty: 15 TABLET | Refills: 0 | Status: SHIPPED | OUTPATIENT
Start: 2025-08-27 | End: 2025-09-06

## 2025-08-27 RX ORDER — HYDROMORPHONE HYDROCHLORIDE 1 MG/ML
1 INJECTION, SOLUTION INTRAMUSCULAR; INTRAVENOUS; SUBCUTANEOUS ONCE
Status: COMPLETED | OUTPATIENT
Start: 2025-08-27 | End: 2025-08-27

## 2025-08-27 RX ADMIN — MORPHINE SULFATE 4 MG: 4 INJECTION, SOLUTION INTRAMUSCULAR; INTRAVENOUS at 14:18

## 2025-08-27 RX ADMIN — LEVOFLOXACIN 500 MG: 500 TABLET, FILM COATED ORAL at 17:37

## 2025-08-27 RX ADMIN — IOHEXOL 100 ML: 350 INJECTION, SOLUTION INTRAVENOUS at 16:37

## 2025-08-27 RX ADMIN — HYDROMORPHONE HYDROCHLORIDE 1 MG: 1 INJECTION, SOLUTION INTRAMUSCULAR; INTRAVENOUS; SUBCUTANEOUS at 17:38

## 2025-08-27 ASSESSMENT — ENCOUNTER SYMPTOMS
DIARRHEA: 0
WHEEZING: 0
FEVER: 1
COUGH: 0
BLOOD IN STOOL: 0
FLANK PAIN: 0
SHORTNESS OF BREATH: 0
PALPITATIONS: 0
NAUSEA: 1
ABDOMINAL PAIN: 1
CHILLS: 1
CONSTIPATION: 0
VOMITING: 0

## 2025-08-28 ENCOUNTER — PATIENT MESSAGE (OUTPATIENT)
Dept: MEDICAL GROUP | Facility: MEDICAL CENTER | Age: 50
End: 2025-08-28
Payer: COMMERCIAL

## 2025-09-24 ENCOUNTER — APPOINTMENT (OUTPATIENT)
Dept: MEDICAL GROUP | Facility: MEDICAL CENTER | Age: 50
End: 2025-09-24
Payer: COMMERCIAL